# Patient Record
Sex: MALE | Race: BLACK OR AFRICAN AMERICAN | NOT HISPANIC OR LATINO | Employment: UNEMPLOYED | URBAN - METROPOLITAN AREA
[De-identification: names, ages, dates, MRNs, and addresses within clinical notes are randomized per-mention and may not be internally consistent; named-entity substitution may affect disease eponyms.]

---

## 2021-10-15 ENCOUNTER — APPOINTMENT (OUTPATIENT)
Dept: ULTRASOUND IMAGING | Facility: HOSPITAL | Age: 56
End: 2021-10-15

## 2021-10-15 ENCOUNTER — HOSPITAL ENCOUNTER (EMERGENCY)
Facility: HOSPITAL | Age: 56
Discharge: HOME | End: 2021-10-15
Attending: EMERGENCY MEDICINE | Admitting: EMERGENCY MEDICINE

## 2021-10-15 VITALS
RESPIRATION RATE: 14 BRPM | HEIGHT: 70 IN | OXYGEN SATURATION: 98 % | BODY MASS INDEX: 23.34 KG/M2 | WEIGHT: 163 LBS | HEART RATE: 69 BPM | SYSTOLIC BLOOD PRESSURE: 153 MMHG | TEMPERATURE: 97.8 F | DIASTOLIC BLOOD PRESSURE: 95 MMHG

## 2021-10-15 DIAGNOSIS — N43.3 HYDROCELE, RIGHT: Primary | ICD-10-CM

## 2021-10-15 DIAGNOSIS — N39.0 URINARY TRACT INFECTION WITHOUT HEMATURIA, SITE UNSPECIFIED: ICD-10-CM

## 2021-10-15 LAB
BACTERIA UR QL AUTO: ABNORMAL /HPF
BILIRUB UR QL STRIP: NEGATIVE
C TRACH RRNA CVX QL NAA+PROBE: NOT DETECTED
CLARITY UR: ABNORMAL
COLOR UR: YELLOW
GLUCOSE UR STRIP-MCNC: NEGATIVE MG/DL
HGB UR QL STRIP.AUTO: ABNORMAL
HYALINE CASTS UR QL AUTO: ABNORMAL /LPF
KETONES UR QL STRIP: NEGATIVE
LEUKOCYTE ESTERASE UR QL STRIP.AUTO: ABNORMAL
N GONORRHOEA RRNA SPEC QL NAA+PROBE: NOT DETECTED
NITRITE UR QL STRIP: POSITIVE
PH UR STRIP.AUTO: 5.5 [PH] (ref 5–8)
PROT UR QL STRIP: NEGATIVE
RBC # UR: ABNORMAL /HPF
REF LAB TEST METHOD: ABNORMAL
SP GR UR STRIP: 1.01 (ref 1–1.03)
SQUAMOUS #/AREA URNS HPF: ABNORMAL /HPF
UROBILINOGEN UR QL STRIP: ABNORMAL
WBC UR QL AUTO: ABNORMAL /HPF

## 2021-10-15 PROCEDURE — 93976 VASCULAR STUDY: CPT

## 2021-10-15 PROCEDURE — 87491 CHLMYD TRACH DNA AMP PROBE: CPT | Performed by: EMERGENCY MEDICINE

## 2021-10-15 PROCEDURE — 87086 URINE CULTURE/COLONY COUNT: CPT | Performed by: EMERGENCY MEDICINE

## 2021-10-15 PROCEDURE — 99283 EMERGENCY DEPT VISIT LOW MDM: CPT

## 2021-10-15 PROCEDURE — 87186 SC STD MICRODIL/AGAR DIL: CPT | Performed by: EMERGENCY MEDICINE

## 2021-10-15 PROCEDURE — 87077 CULTURE AEROBIC IDENTIFY: CPT | Performed by: EMERGENCY MEDICINE

## 2021-10-15 PROCEDURE — 76870 US EXAM SCROTUM: CPT

## 2021-10-15 PROCEDURE — 87591 N.GONORRHOEAE DNA AMP PROB: CPT | Performed by: EMERGENCY MEDICINE

## 2021-10-15 PROCEDURE — 81001 URINALYSIS AUTO W/SCOPE: CPT | Performed by: EMERGENCY MEDICINE

## 2021-10-15 RX ORDER — CEFDINIR 300 MG/1
300 CAPSULE ORAL 2 TIMES DAILY
Qty: 14 CAPSULE | Refills: 0 | Status: SHIPPED | OUTPATIENT
Start: 2021-10-15 | End: 2021-10-22

## 2021-10-15 NOTE — DISCHARGE INSTRUCTIONS
Follow-up with your primary doctor and urologist.  Return to the emergency room for any new or worsening symptoms or if you have any other questions or concerns.  Take antibiotic as prescribed.

## 2021-10-15 NOTE — ED PROVIDER NOTES
"Subjective   Chief complaint: Right testicle swelling    56-year-old male presents with right testicle swelling.  Patient states he has noticed this for about 2 or 3 days.  He reports minimal discomfort with this.  He has had no dysuria or hematuria.  He has had no urethral discharge.  He denies any fever.  He denies any injury to the area.  He went to an urgent care center today and they told him to come to the emergency room.  He states he feels like the swelling is getting slightly better at this point.      History provided by:  Patient      Review of Systems   Constitutional: Negative for fever.   HENT: Negative for congestion and sore throat.    Respiratory: Negative for cough and shortness of breath.    Cardiovascular: Negative for chest pain.   Gastrointestinal: Negative for abdominal pain.   Genitourinary: Positive for scrotal swelling. Negative for dysuria, penile discharge and testicular pain.   Musculoskeletal: Negative for back pain.   Neurological: Negative for headaches.       No past medical history on file.    No Known Allergies    No past surgical history on file.    No family history on file.    Social History     Socioeconomic History   • Marital status: Single       /95 (BP Location: Left arm, Patient Position: Sitting)   Pulse 69   Temp 97.8 °F (36.6 °C) (Temporal)   Resp 14   Ht 177.8 cm (70\")   Wt 73.9 kg (163 lb)   SpO2 98%   BMI 23.39 kg/m²       Objective   Physical Exam  Vitals and nursing note reviewed.   Constitutional:       Appearance: Normal appearance. He is well-developed.   HENT:      Head: Normocephalic and atraumatic.   Eyes:      Pupils: Pupils are equal, round, and reactive to light.   Cardiovascular:      Rate and Rhythm: Normal rate and regular rhythm.   Pulmonary:      Effort: Pulmonary effort is normal. No respiratory distress.   Abdominal:      General: Bowel sounds are normal.      Palpations: Abdomen is soft.      Tenderness: There is no abdominal " tenderness.   Genitourinary:     Comments: Mild diffuse right testicle swelling with minimal tenderness on exam.  There is no erythema or warmth.  Left testicle is normal.  No urethral discharge.  Skin:     General: Skin is warm and dry.   Neurological:      Mental Status: He is alert and oriented to person, place, and time.         Procedures           ED Course      Results for orders placed or performed during the hospital encounter of 10/15/21   Chlamydia trachomatis, Neisseria gonorrhoeae, PCR - Urine, Urine, Clean Catch    Specimen: Urine, Clean Catch   Result Value Ref Range    Chlamydia DNA by PCR Not Detected Not Detected, Invalid    Neisseria gonorrhoeae by PCR Not Detected Not Detected   Urinalysis With Culture If Indicated - Urine, Clean Catch    Specimen: Urine, Clean Catch   Result Value Ref Range    Color, UA Yellow Yellow, Straw    Appearance, UA Cloudy (A) Clear    pH, UA 5.5 5.0 - 8.0    Specific Gravity, UA 1.014 1.005 - 1.030    Glucose, UA Negative Negative    Ketones, UA Negative Negative    Bilirubin, UA Negative Negative    Blood, UA Small (1+) (A) Negative    Protein, UA Negative Negative    Leuk Esterase, UA Moderate (2+) (A) Negative    Nitrite, UA Positive (A) Negative    Urobilinogen, UA 0.2 E.U./dL 0.2 - 1.0 E.U./dL   Urinalysis, Microscopic Only - Urine, Clean Catch    Specimen: Urine, Clean Catch   Result Value Ref Range    RBC, UA 3-5 (A) None Seen /HPF    WBC, UA 31-50 (A) None Seen /HPF    Bacteria, UA 3+ (A) None Seen /HPF    Squamous Epithelial Cells, UA 0-2 None Seen, 0-2 /HPF    Hyaline Casts, UA 0-2 None Seen /LPF    Methodology Automated Microscopy      US Scrotum & Testicles    Result Date: 10/15/2021  1.Both testicles and epididymis appear within normal limits. 2.Moderate right and mild left hydroceles.  Electronically Signed By-Yoshi Samaniego MD On:10/15/2021 2:34 PM This report was finalized on [id number] by  Yoshi Samaniego MD.                                          MDM   Patient had the above evaluation.  Results were discussed with the patient.  Scrotal ultrasound was obtained and showed them both testicles and epididymis were normal.  He did have moderate right and mild left hydroceles.  Urinalysis shows evidence of urinary tract infection with moderate leukocyte esterase, positive nitrites, 3+ bacteria.  GC and Chlamydia are negative.  Patient states he just had a urinary tract infection a couple months ago.  He has seen urology in the past for voiding issues.  He will follow-up with urology again for his frequent UTIs.  He will be given a prescription for cefdinir.      Final diagnoses:   Hydrocele, right   Urinary tract infection without hematuria, site unspecified       ED Disposition  ED Disposition     ED Disposition Condition Comment    Discharge Stable           Spencer Randall MD  [address] [place] [address] [place] [address] [phone]    Call in 2 days           Medication List      New Prescriptions    cefdinir 300 MG capsule  Commonly known as: OMNICEF  Take 1 capsule by mouth 2 (Two) Times a Day for 7 days.           Where to Get Your Medications      These medications were sent to [id number] - [place], [place] - [address] [facility] AT [facility] [address] [address] [address] - [phone]  - [phone]   [address] [place] [address]    Phone: [phone]   · cefdinir 300 MG capsule          Blayne Miguel MD  10/15/21 8149

## 2021-10-17 LAB — BACTERIA SPEC AEROBE CULT: ABNORMAL

## 2021-10-17 NOTE — PHARMACY RECOMMENDATION
Patient urine culture resulted with E. coli. Susceptible to Cephalosporins (3rd gen). Patient was given Rx for cefdinir. Therapy is appropriate coverage. No further follow-up required..    Microbiology Results (last 10 days)     Procedure  Component  Value  -  Date/Time  Chlamydia trachomatis, Neisseria gonorrhoeae, PCR - Urine, Urine, Clean Catch [[id number]]  (Normal)  Collected: 10/15/21 1412  Lab Status: Final result  Specimen: Urine, Clean Catch  Updated: 10/15/21 1548  Chlamydia DNA by PCR  Not Detected  Neisseria gonorrhoeae by PCR  Not Detected  Urine Culture - Urine, Urine, Clean Catch [[id number]]  (Abnormal)  (Susceptibility)  Collected: 10/15/21 1412  Lab Status: Final result  Specimen: Urine, Clean Catch  Updated: 10/17/21 1101  Urine Culture  >100,000 CFU/mL Escherichia coli  Susceptibility   Escherichia coli  ARCHANA  Ampicillin  Resistant  Ampicillin + Sulbactam  Resistant  Cefazolin  Susceptible  Cefepime  Susceptible  Ceftazidime  Susceptible  Ceftriaxone  Susceptible  Gentamicin  Susceptible  Levofloxacin  Susceptible  Nitrofurantoin  Susceptible  Piperacillin + Tazobactam  Susceptible  Tetracycline  Susceptible  Trimethoprim + Sulfamethoxazole  Susceptible      Linear View        Kirstie Wan, Cheryl  10/17/2021 11:35 EDT

## 2023-10-05 ENCOUNTER — HOSPITAL ENCOUNTER (INPATIENT)
Facility: HOSPITAL | Age: 58
LOS: 1 days | Discharge: HOME | DRG: 322 | End: 2023-10-07
Attending: EMERGENCY MEDICINE | Admitting: STUDENT IN AN ORGANIZED HEALTH CARE EDUCATION/TRAINING PROGRAM
Payer: MEDICARE

## 2023-10-05 ENCOUNTER — APPOINTMENT (OUTPATIENT)
Dept: GENERAL RADIOLOGY | Facility: HOSPITAL | Age: 58
DRG: 322 | End: 2023-10-05
Payer: MEDICARE

## 2023-10-05 DIAGNOSIS — I21.4 NSTEMI (NON-ST ELEVATED MYOCARDIAL INFARCTION): Primary | ICD-10-CM

## 2023-10-05 DIAGNOSIS — R07.9 CHEST PAIN, UNSPECIFIED TYPE: ICD-10-CM

## 2023-10-05 DIAGNOSIS — R94.31 ABNORMAL EKG: ICD-10-CM

## 2023-10-05 LAB
ALBUMIN SERPL-MCNC: 4 G/DL (ref 3.5–5.2)
ALBUMIN/GLOB SERPL: 1.3 G/DL
ALP SERPL-CCNC: 56 U/L (ref 39–117)
ALT SERPL W P-5'-P-CCNC: 26 U/L (ref 1–41)
AMPHET+METHAMPHET UR QL: NEGATIVE
ANION GAP SERPL CALCULATED.3IONS-SCNC: 10 MMOL/L (ref 5–15)
AST SERPL-CCNC: 43 U/L (ref 1–40)
BARBITURATES UR QL SCN: NEGATIVE
BASOPHILS # BLD AUTO: 0 10*3/MM3 (ref 0–0.2)
BASOPHILS NFR BLD AUTO: 0.7 % (ref 0–1.5)
BENZODIAZ UR QL SCN: NEGATIVE
BILIRUB SERPL-MCNC: 0.4 MG/DL (ref 0–1.2)
BUN SERPL-MCNC: 12 MG/DL (ref 6–20)
BUN/CREAT SERPL: 12.9 (ref 7–25)
CALCIUM SPEC-SCNC: 9.1 MG/DL (ref 8.6–10.5)
CANNABINOIDS SERPL QL: POSITIVE
CHLORIDE SERPL-SCNC: 103 MMOL/L (ref 98–107)
CO2 SERPL-SCNC: 24 MMOL/L (ref 22–29)
COCAINE UR QL: NEGATIVE
CREAT SERPL-MCNC: 0.93 MG/DL (ref 0.76–1.27)
D DIMER PPP FEU-MCNC: 0.29 MG/L (FEU) (ref 0–0.57)
DEPRECATED RDW RBC AUTO: 52.9 FL (ref 37–54)
EGFRCR SERPLBLD CKD-EPI 2021: 95.8 ML/MIN/1.73
EOSINOPHIL # BLD AUTO: 0.2 10*3/MM3 (ref 0–0.4)
EOSINOPHIL NFR BLD AUTO: 3.5 % (ref 0.3–6.2)
ERYTHROCYTE [DISTWIDTH] IN BLOOD BY AUTOMATED COUNT: 14.9 % (ref 12.3–15.4)
GEN 5 2HR TROPONIN T REFLEX: 122 NG/L
GLOBULIN UR ELPH-MCNC: 3.1 GM/DL
GLUCOSE SERPL-MCNC: 119 MG/DL (ref 65–99)
HCT VFR BLD AUTO: 42.4 % (ref 37.5–51)
HGB BLD-MCNC: 14 G/DL (ref 13–17.7)
HOLD SPECIMEN: NORMAL
HOLD SPECIMEN: NORMAL
LYMPHOCYTES # BLD AUTO: 1.3 10*3/MM3 (ref 0.7–3.1)
LYMPHOCYTES NFR BLD AUTO: 25.3 % (ref 19.6–45.3)
MCH RBC QN AUTO: 31.5 PG (ref 26.6–33)
MCHC RBC AUTO-ENTMCNC: 33 G/DL (ref 31.5–35.7)
MCV RBC AUTO: 95.2 FL (ref 79–97)
METHADONE UR QL SCN: NEGATIVE
MONOCYTES # BLD AUTO: 0.5 10*3/MM3 (ref 0.1–0.9)
MONOCYTES NFR BLD AUTO: 10 % (ref 5–12)
NEUTROPHILS NFR BLD AUTO: 3 10*3/MM3 (ref 1.7–7)
NEUTROPHILS NFR BLD AUTO: 60.5 % (ref 42.7–76)
NRBC BLD AUTO-RTO: 0 /100 WBC (ref 0–0.2)
OPIATES UR QL: NEGATIVE
OXYCODONE UR QL SCN: NEGATIVE
PLATELET # BLD AUTO: 196 10*3/MM3 (ref 140–450)
PMV BLD AUTO: 7.6 FL (ref 6–12)
POTASSIUM SERPL-SCNC: 3.9 MMOL/L (ref 3.5–5.2)
PROT SERPL-MCNC: 7.1 G/DL (ref 6–8.5)
RBC # BLD AUTO: 4.45 10*6/MM3 (ref 4.14–5.8)
SODIUM SERPL-SCNC: 137 MMOL/L (ref 136–145)
TROPONIN T DELTA: 28 NG/L
TROPONIN T SERPL HS-MCNC: 94 NG/L
WBC NRBC COR # BLD: 5 10*3/MM3 (ref 3.4–10.8)
WHOLE BLOOD HOLD COAG: NORMAL

## 2023-10-05 PROCEDURE — 84484 ASSAY OF TROPONIN QUANT: CPT | Performed by: PHYSICIAN ASSISTANT

## 2023-10-05 PROCEDURE — 71045 X-RAY EXAM CHEST 1 VIEW: CPT

## 2023-10-05 PROCEDURE — 99285 EMERGENCY DEPT VISIT HI MDM: CPT

## 2023-10-05 PROCEDURE — 80307 DRUG TEST PRSMV CHEM ANLYZR: CPT | Performed by: PHYSICIAN ASSISTANT

## 2023-10-05 PROCEDURE — 36415 COLL VENOUS BLD VENIPUNCTURE: CPT

## 2023-10-05 PROCEDURE — 93005 ELECTROCARDIOGRAM TRACING: CPT | Performed by: EMERGENCY MEDICINE

## 2023-10-05 PROCEDURE — 85379 FIBRIN DEGRADATION QUANT: CPT | Performed by: PHYSICIAN ASSISTANT

## 2023-10-05 PROCEDURE — 85610 PROTHROMBIN TIME: CPT | Performed by: PHYSICIAN ASSISTANT

## 2023-10-05 PROCEDURE — 85025 COMPLETE CBC W/AUTO DIFF WBC: CPT | Performed by: PHYSICIAN ASSISTANT

## 2023-10-05 PROCEDURE — 80053 COMPREHEN METABOLIC PANEL: CPT | Performed by: PHYSICIAN ASSISTANT

## 2023-10-05 PROCEDURE — 85730 THROMBOPLASTIN TIME PARTIAL: CPT | Performed by: PHYSICIAN ASSISTANT

## 2023-10-05 PROCEDURE — 93005 ELECTROCARDIOGRAM TRACING: CPT

## 2023-10-05 RX ORDER — ASPIRIN 81 MG/1
324 TABLET, CHEWABLE ORAL ONCE
Status: COMPLETED | OUTPATIENT
Start: 2023-10-05 | End: 2023-10-05

## 2023-10-05 RX ORDER — NITROGLYCERIN 20 MG/100ML
5-200 INJECTION INTRAVENOUS
Status: DISCONTINUED | OUTPATIENT
Start: 2023-10-06 | End: 2023-10-07 | Stop reason: HOSPADM

## 2023-10-05 RX ORDER — HEPARIN SODIUM 10000 [USP'U]/100ML
12 INJECTION, SOLUTION INTRAVENOUS
Status: DISCONTINUED | OUTPATIENT
Start: 2023-10-06 | End: 2023-10-07 | Stop reason: HOSPADM

## 2023-10-05 RX ORDER — FAMOTIDINE 10 MG/ML
20 INJECTION, SOLUTION INTRAVENOUS ONCE
Status: COMPLETED | OUTPATIENT
Start: 2023-10-05 | End: 2023-10-05

## 2023-10-05 RX ORDER — ASPIRIN 81 MG/1
324 TABLET, CHEWABLE ORAL ONCE
Status: DISCONTINUED | OUTPATIENT
Start: 2023-10-05 | End: 2023-10-05

## 2023-10-05 RX ORDER — SODIUM CHLORIDE 0.9 % (FLUSH) 0.9 %
10 SYRINGE (ML) INJECTION AS NEEDED
Status: DISCONTINUED | OUTPATIENT
Start: 2023-10-05 | End: 2023-10-07 | Stop reason: HOSPADM

## 2023-10-05 RX ADMIN — FAMOTIDINE 20 MG: 10 INJECTION INTRAVENOUS at 22:04

## 2023-10-05 RX ADMIN — ASPIRIN 81 MG CHEWABLE TABLET 324 MG: 81 TABLET CHEWABLE at 22:32

## 2023-10-05 RX ADMIN — NITROGLYCERIN 1 INCH: 20 OINTMENT TOPICAL at 22:32

## 2023-10-05 NOTE — Clinical Note
Second inflation of balloon - Max pressure = 14 nile. 2nd Inflation of balloon - Duration = 28 seconds.

## 2023-10-05 NOTE — Clinical Note
Third inflation of balloon - Max pressure = 18 nile. 3rd Inflation of balloon - Duration = 14 seconds.

## 2023-10-05 NOTE — Clinical Note
Fourth inflation of balloon - Max pressure = 6 nile. 4th Inflation of balloon - Duration = 12 seconds.

## 2023-10-05 NOTE — Clinical Note
Second inflation of balloon - Max pressure = 18 nile. 2nd Inflation of balloon - Duration = 18 seconds.

## 2023-10-05 NOTE — Clinical Note
Second inflation of balloon - Max pressure = 6 nile. 2nd Inflation of balloon - Duration = 10 seconds.

## 2023-10-05 NOTE — Clinical Note
Third inflation of balloon - Max pressure = 6 nile. 3rd Inflation of balloon - Duration = 17 seconds.

## 2023-10-06 ENCOUNTER — APPOINTMENT (OUTPATIENT)
Dept: CARDIOLOGY | Facility: HOSPITAL | Age: 58
DRG: 322 | End: 2023-10-06
Payer: MEDICARE

## 2023-10-06 PROBLEM — R07.9 CHEST PAIN: Status: ACTIVE | Noted: 2023-10-05

## 2023-10-06 PROBLEM — I21.4 NSTEMI (NON-ST ELEVATED MYOCARDIAL INFARCTION): Status: ACTIVE | Noted: 2023-10-06

## 2023-10-06 PROBLEM — R94.31 ABNORMAL EKG: Status: ACTIVE | Noted: 2023-10-05

## 2023-10-06 LAB
ACT BLD: 149 SECONDS (ref 89–137)
ACT BLD: 161 SECONDS (ref 89–137)
ACT BLD: 245 SECONDS (ref 89–137)
ACT BLD: 281 SECONDS (ref 89–137)
ANION GAP SERPL CALCULATED.3IONS-SCNC: 10 MMOL/L (ref 5–15)
APTT PPP: 25.3 SECONDS (ref 61–76.5)
APTT PPP: 25.4 SECONDS (ref 61–76.5)
APTT PPP: 48.5 SECONDS (ref 61–76.5)
BASOPHILS # BLD AUTO: 0 10*3/MM3 (ref 0–0.2)
BASOPHILS NFR BLD AUTO: 0.6 % (ref 0–1.5)
BH CV ECHO MEAS - ACS: 1.79 CM
BH CV ECHO MEAS - AO MAX PG: 9.1 MMHG
BH CV ECHO MEAS - AO MEAN PG: 5.5 MMHG
BH CV ECHO MEAS - AO ROOT DIAM: 3.3 CM
BH CV ECHO MEAS - AO V2 MAX: 150.5 CM/SEC
BH CV ECHO MEAS - AO V2 VTI: 31.1 CM
BH CV ECHO MEAS - AVA(I,D): 2.1 CM2
BH CV ECHO MEAS - EDV(CUBED): 88.7 ML
BH CV ECHO MEAS - EDV(MOD-SP4): 73.4 ML
BH CV ECHO MEAS - EF(MOD-BP): 51 %
BH CV ECHO MEAS - EF(MOD-SP4): 51.4 %
BH CV ECHO MEAS - ESV(CUBED): 36.6 ML
BH CV ECHO MEAS - ESV(MOD-SP4): 35.6 ML
BH CV ECHO MEAS - FS: 25.6 %
BH CV ECHO MEAS - IVS/LVPW: 0.98 CM
BH CV ECHO MEAS - IVSD: 1.16 CM
BH CV ECHO MEAS - LA DIMENSION: 3 CM
BH CV ECHO MEAS - LV DIASTOLIC VOL/BSA (35-75): 38.3 CM2
BH CV ECHO MEAS - LV MASS(C)D: 188 GRAMS
BH CV ECHO MEAS - LV MAX PG: 4.6 MMHG
BH CV ECHO MEAS - LV MEAN PG: 2.9 MMHG
BH CV ECHO MEAS - LV SYSTOLIC VOL/BSA (12-30): 18.6 CM2
BH CV ECHO MEAS - LV V1 MAX: 107.1 CM/SEC
BH CV ECHO MEAS - LV V1 VTI: 20.2 CM
BH CV ECHO MEAS - LVIDD: 4.5 CM
BH CV ECHO MEAS - LVIDS: 3.3 CM
BH CV ECHO MEAS - LVOT AREA: 3.2 CM2
BH CV ECHO MEAS - LVOT DIAM: 2.03 CM
BH CV ECHO MEAS - LVPWD: 1.18 CM
BH CV ECHO MEAS - MR MAX PG: 24.9 MMHG
BH CV ECHO MEAS - MR MAX VEL: 249.6 CM/SEC
BH CV ECHO MEAS - MV A MAX VEL: 82.5 CM/SEC
BH CV ECHO MEAS - MV DEC SLOPE: 388.5 CM/SEC2
BH CV ECHO MEAS - MV DEC TIME: 0.18 SEC
BH CV ECHO MEAS - MV E MAX VEL: 69.9 CM/SEC
BH CV ECHO MEAS - MV E/A: 0.85
BH CV ECHO MEAS - MV MAX PG: 2.7 MMHG
BH CV ECHO MEAS - MV MEAN PG: 1.51 MMHG
BH CV ECHO MEAS - MV V2 VTI: 23 CM
BH CV ECHO MEAS - MVA(VTI): 2.8 CM2
BH CV ECHO MEAS - PA ACC TIME: 0.06 SEC
BH CV ECHO MEAS - PA V2 MAX: 91.2 CM/SEC
BH CV ECHO MEAS - PI END-D VEL: 105.7 CM/SEC
BH CV ECHO MEAS - PULM A REVS DUR: 0.1 SEC
BH CV ECHO MEAS - PULM A REVS VEL: 24.7 CM/SEC
BH CV ECHO MEAS - PULM DIAS VEL: 34.4 CM/SEC
BH CV ECHO MEAS - PULM S/D: 1.38
BH CV ECHO MEAS - PULM SYS VEL: 47.5 CM/SEC
BH CV ECHO MEAS - RAP SYSTOLE: 3 MMHG
BH CV ECHO MEAS - RV MAX PG: 1.16 MMHG
BH CV ECHO MEAS - RV V1 MAX: 53.8 CM/SEC
BH CV ECHO MEAS - RV V1 VTI: 10.8 CM
BH CV ECHO MEAS - RVDD: 2.9 CM
BH CV ECHO MEAS - RVSP: 23 MMHG
BH CV ECHO MEAS - SI(MOD-SP4): 19.7 ML/M2
BH CV ECHO MEAS - SV(LVOT): 65.2 ML
BH CV ECHO MEAS - SV(MOD-SP4): 37.7 ML
BH CV ECHO MEAS - TR MAX PG: 20 MMHG
BH CV ECHO MEAS - TR MAX VEL: 221.7 CM/SEC
BUN SERPL-MCNC: 9 MG/DL (ref 6–20)
BUN/CREAT SERPL: 10.3 (ref 7–25)
CALCIUM SPEC-SCNC: 8.7 MG/DL (ref 8.6–10.5)
CHLORIDE SERPL-SCNC: 103 MMOL/L (ref 98–107)
CO2 SERPL-SCNC: 25 MMOL/L (ref 22–29)
CREAT SERPL-MCNC: 0.87 MG/DL (ref 0.76–1.27)
DEPRECATED RDW RBC AUTO: 49 FL (ref 37–54)
EGFRCR SERPLBLD CKD-EPI 2021: 100.6 ML/MIN/1.73
EOSINOPHIL # BLD AUTO: 0.3 10*3/MM3 (ref 0–0.4)
EOSINOPHIL NFR BLD AUTO: 7.1 % (ref 0.3–6.2)
ERYTHROCYTE [DISTWIDTH] IN BLOOD BY AUTOMATED COUNT: 14.7 % (ref 12.3–15.4)
GLUCOSE SERPL-MCNC: 96 MG/DL (ref 65–99)
HCT VFR BLD AUTO: 41.7 % (ref 37.5–51)
HGB BLD-MCNC: 13.6 G/DL (ref 13–17.7)
INR PPP: 0.98 (ref 0.93–1.1)
LYMPHOCYTES # BLD AUTO: 1.4 10*3/MM3 (ref 0.7–3.1)
LYMPHOCYTES NFR BLD AUTO: 34.4 % (ref 19.6–45.3)
MCH RBC QN AUTO: 31.7 PG (ref 26.6–33)
MCHC RBC AUTO-ENTMCNC: 32.7 G/DL (ref 31.5–35.7)
MCV RBC AUTO: 96.8 FL (ref 79–97)
MONOCYTES # BLD AUTO: 0.4 10*3/MM3 (ref 0.1–0.9)
MONOCYTES NFR BLD AUTO: 10.3 % (ref 5–12)
NEUTROPHILS NFR BLD AUTO: 1.9 10*3/MM3 (ref 1.7–7)
NEUTROPHILS NFR BLD AUTO: 47.6 % (ref 42.7–76)
NRBC BLD AUTO-RTO: 0.1 /100 WBC (ref 0–0.2)
PLATELET # BLD AUTO: 203 10*3/MM3 (ref 140–450)
PMV BLD AUTO: 7.9 FL (ref 6–12)
POTASSIUM SERPL-SCNC: 3.7 MMOL/L (ref 3.5–5.2)
PROTHROMBIN TIME: 10.7 SECONDS (ref 9.6–11.7)
QT INTERVAL: 426 MS
QT INTERVAL: 431 MS
QT INTERVAL: 435 MS
QTC INTERVAL: 442 MS
QTC INTERVAL: 446 MS
QTC INTERVAL: 448 MS
RBC # BLD AUTO: 4.3 10*6/MM3 (ref 4.14–5.8)
SODIUM SERPL-SCNC: 138 MMOL/L (ref 136–145)
TROPONIN T SERPL HS-MCNC: 322 NG/L
WBC NRBC COR # BLD: 4 10*3/MM3 (ref 3.4–10.8)

## 2023-10-06 PROCEDURE — 25010000002 HYDRALAZINE PER 20 MG: Performed by: NURSE PRACTITIONER

## 2023-10-06 PROCEDURE — 25510000001 IOPAMIDOL PER 1 ML: Performed by: INTERNAL MEDICINE

## 2023-10-06 PROCEDURE — 25010000002 HEPARIN (PORCINE) PER 1000 UNITS: Performed by: INTERNAL MEDICINE

## 2023-10-06 PROCEDURE — 25010000002 NITROGLYCERIN 5 MG/ML SOLUTION: Performed by: INTERNAL MEDICINE

## 2023-10-06 PROCEDURE — 85730 THROMBOPLASTIN TIME PARTIAL: CPT | Performed by: STUDENT IN AN ORGANIZED HEALTH CARE EDUCATION/TRAINING PROGRAM

## 2023-10-06 PROCEDURE — 4A023N7 MEASUREMENT OF CARDIAC SAMPLING AND PRESSURE, LEFT HEART, PERCUTANEOUS APPROACH: ICD-10-PCS | Performed by: INTERNAL MEDICINE

## 2023-10-06 PROCEDURE — 93306 TTE W/DOPPLER COMPLETE: CPT

## 2023-10-06 PROCEDURE — 99222 1ST HOSP IP/OBS MODERATE 55: CPT | Performed by: STUDENT IN AN ORGANIZED HEALTH CARE EDUCATION/TRAINING PROGRAM

## 2023-10-06 PROCEDURE — 25010000002 NITROGLYCERIN 200 MCG/ML SOLUTION: Performed by: PHYSICIAN ASSISTANT

## 2023-10-06 PROCEDURE — 93458 L HRT ARTERY/VENTRICLE ANGIO: CPT | Performed by: INTERNAL MEDICINE

## 2023-10-06 PROCEDURE — C1874 STENT, COATED/COV W/DEL SYS: HCPCS | Performed by: INTERNAL MEDICINE

## 2023-10-06 PROCEDURE — C1894 INTRO/SHEATH, NON-LASER: HCPCS | Performed by: INTERNAL MEDICINE

## 2023-10-06 PROCEDURE — C9600 PERC DRUG-EL COR STENT SING: HCPCS | Performed by: INTERNAL MEDICINE

## 2023-10-06 PROCEDURE — C1887 CATHETER, GUIDING: HCPCS | Performed by: INTERNAL MEDICINE

## 2023-10-06 PROCEDURE — 85347 COAGULATION TIME ACTIVATED: CPT

## 2023-10-06 PROCEDURE — 99153 MOD SED SAME PHYS/QHP EA: CPT | Performed by: INTERNAL MEDICINE

## 2023-10-06 PROCEDURE — 25010000002 FENTANYL CITRATE (PF) 100 MCG/2ML SOLUTION: Performed by: INTERNAL MEDICINE

## 2023-10-06 PROCEDURE — 25010000002 HEPARIN (PORCINE) 25000-0.45 UT/250ML-% SOLUTION: Performed by: PHYSICIAN ASSISTANT

## 2023-10-06 PROCEDURE — C1769 GUIDE WIRE: HCPCS | Performed by: INTERNAL MEDICINE

## 2023-10-06 PROCEDURE — 84484 ASSAY OF TROPONIN QUANT: CPT | Performed by: NURSE PRACTITIONER

## 2023-10-06 PROCEDURE — C1725 CATH, TRANSLUMIN NON-LASER: HCPCS | Performed by: INTERNAL MEDICINE

## 2023-10-06 PROCEDURE — 93306 TTE W/DOPPLER COMPLETE: CPT | Performed by: INTERNAL MEDICINE

## 2023-10-06 PROCEDURE — 93005 ELECTROCARDIOGRAM TRACING: CPT | Performed by: STUDENT IN AN ORGANIZED HEALTH CARE EDUCATION/TRAINING PROGRAM

## 2023-10-06 PROCEDURE — B2111ZZ FLUOROSCOPY OF MULTIPLE CORONARY ARTERIES USING LOW OSMOLAR CONTRAST: ICD-10-PCS | Performed by: INTERNAL MEDICINE

## 2023-10-06 PROCEDURE — 25010000002 MIDAZOLAM PER 1 MG: Performed by: INTERNAL MEDICINE

## 2023-10-06 PROCEDURE — 92928 PRQ TCAT PLMT NTRAC ST 1 LES: CPT | Performed by: INTERNAL MEDICINE

## 2023-10-06 PROCEDURE — 99152 MOD SED SAME PHYS/QHP 5/>YRS: CPT | Performed by: INTERNAL MEDICINE

## 2023-10-06 PROCEDURE — 027034Z DILATION OF CORONARY ARTERY, ONE ARTERY WITH DRUG-ELUTING INTRALUMINAL DEVICE, PERCUTANEOUS APPROACH: ICD-10-PCS | Performed by: INTERNAL MEDICINE

## 2023-10-06 PROCEDURE — 80048 BASIC METABOLIC PNL TOTAL CA: CPT | Performed by: NURSE PRACTITIONER

## 2023-10-06 PROCEDURE — 85025 COMPLETE CBC W/AUTO DIFF WBC: CPT | Performed by: NURSE PRACTITIONER

## 2023-10-06 PROCEDURE — 93005 ELECTROCARDIOGRAM TRACING: CPT | Performed by: INTERNAL MEDICINE

## 2023-10-06 PROCEDURE — 93010 ELECTROCARDIOGRAM REPORT: CPT | Performed by: INTERNAL MEDICINE

## 2023-10-06 DEVICE — XIENCE SKYPOINT™ EVEROLIMUS ELUTING CORONARY STENT SYSTEM 2.50 MM X 33 MM / RAPID-EXCHANGE
Type: IMPLANTABLE DEVICE | Site: CORONARY | Status: FUNCTIONAL
Brand: XIENCE SKYPOINT™

## 2023-10-06 RX ORDER — BISACODYL 10 MG
10 SUPPOSITORY, RECTAL RECTAL DAILY PRN
Status: DISCONTINUED | OUTPATIENT
Start: 2023-10-06 | End: 2023-10-07 | Stop reason: HOSPADM

## 2023-10-06 RX ORDER — CHOLECALCIFEROL (VITAMIN D3) 125 MCG
5 CAPSULE ORAL NIGHTLY PRN
Status: DISCONTINUED | OUTPATIENT
Start: 2023-10-06 | End: 2023-10-07 | Stop reason: HOSPADM

## 2023-10-06 RX ORDER — BUPROPION HYDROCHLORIDE 300 MG/1
300 TABLET ORAL DAILY
COMMUNITY

## 2023-10-06 RX ORDER — NITROGLYCERIN 5 MG/ML
INJECTION, SOLUTION INTRAVENOUS
Status: DISCONTINUED | OUTPATIENT
Start: 2023-10-06 | End: 2023-10-06 | Stop reason: HOSPADM

## 2023-10-06 RX ORDER — AMLODIPINE AND VALSARTAN 5; 160 MG/1; MG/1
1 TABLET ORAL
COMMUNITY

## 2023-10-06 RX ORDER — ONDANSETRON 4 MG/1
4 TABLET, FILM COATED ORAL EVERY 6 HOURS PRN
Status: DISCONTINUED | OUTPATIENT
Start: 2023-10-06 | End: 2023-10-07 | Stop reason: HOSPADM

## 2023-10-06 RX ORDER — METRONIDAZOLE 7.5 MG/G
GEL TOPICAL 2 TIMES DAILY
Status: DISCONTINUED | OUTPATIENT
Start: 2023-10-06 | End: 2023-10-07 | Stop reason: HOSPADM

## 2023-10-06 RX ORDER — ONDANSETRON 2 MG/ML
4 INJECTION INTRAMUSCULAR; INTRAVENOUS EVERY 6 HOURS PRN
Status: DISCONTINUED | OUTPATIENT
Start: 2023-10-06 | End: 2023-10-07 | Stop reason: HOSPADM

## 2023-10-06 RX ORDER — BUPROPION HYDROCHLORIDE 150 MG/1
300 TABLET ORAL DAILY
Status: DISCONTINUED | OUTPATIENT
Start: 2023-10-06 | End: 2023-10-07 | Stop reason: HOSPADM

## 2023-10-06 RX ORDER — ALUMINA, MAGNESIA, AND SIMETHICONE 2400; 2400; 240 MG/30ML; MG/30ML; MG/30ML
15 SUSPENSION ORAL EVERY 6 HOURS PRN
Status: DISCONTINUED | OUTPATIENT
Start: 2023-10-06 | End: 2023-10-07 | Stop reason: HOSPADM

## 2023-10-06 RX ORDER — ABACAVIR SULFATE, DOLUTEGRAVIR SODIUM, LAMIVUDINE 600; 50; 300 MG/1; MG/1; MG/1
1 TABLET, FILM COATED ORAL DAILY
COMMUNITY

## 2023-10-06 RX ORDER — METRONIDAZOLE 10 MG/G
1 GEL TOPICAL DAILY
COMMUNITY

## 2023-10-06 RX ORDER — ASPIRIN 81 MG/1
81 TABLET ORAL DAILY
Status: DISCONTINUED | OUTPATIENT
Start: 2023-10-07 | End: 2023-10-07 | Stop reason: HOSPADM

## 2023-10-06 RX ORDER — VALSARTAN 80 MG/1
160 TABLET ORAL
Status: DISCONTINUED | OUTPATIENT
Start: 2023-10-06 | End: 2023-10-07 | Stop reason: HOSPADM

## 2023-10-06 RX ORDER — NITROGLYCERIN 0.4 MG/1
0.4 TABLET SUBLINGUAL
Status: DISCONTINUED | OUTPATIENT
Start: 2023-10-06 | End: 2023-10-07 | Stop reason: HOSPADM

## 2023-10-06 RX ORDER — POLYETHYLENE GLYCOL 3350 17 G/17G
17 POWDER, FOR SOLUTION ORAL DAILY PRN
Status: DISCONTINUED | OUTPATIENT
Start: 2023-10-06 | End: 2023-10-07 | Stop reason: HOSPADM

## 2023-10-06 RX ORDER — ATORVASTATIN CALCIUM 40 MG/1
80 TABLET, FILM COATED ORAL NIGHTLY
Status: DISCONTINUED | OUTPATIENT
Start: 2023-10-06 | End: 2023-10-07 | Stop reason: HOSPADM

## 2023-10-06 RX ORDER — SODIUM CHLORIDE 9 MG/ML
30 INJECTION, SOLUTION INTRAVENOUS CONTINUOUS
Status: DISPENSED | OUTPATIENT
Start: 2023-10-06 | End: 2023-10-06

## 2023-10-06 RX ORDER — MIDAZOLAM HYDROCHLORIDE 1 MG/ML
INJECTION INTRAMUSCULAR; INTRAVENOUS
Status: DISCONTINUED | OUTPATIENT
Start: 2023-10-06 | End: 2023-10-06 | Stop reason: HOSPADM

## 2023-10-06 RX ORDER — SODIUM CHLORIDE 9 MG/ML
250 INJECTION, SOLUTION INTRAVENOUS ONCE AS NEEDED
Status: DISCONTINUED | OUTPATIENT
Start: 2023-10-06 | End: 2023-10-07 | Stop reason: HOSPADM

## 2023-10-06 RX ORDER — ACETAMINOPHEN 160 MG/5ML
650 SOLUTION ORAL EVERY 4 HOURS PRN
Status: DISCONTINUED | OUTPATIENT
Start: 2023-10-06 | End: 2023-10-07 | Stop reason: HOSPADM

## 2023-10-06 RX ORDER — ATORVASTATIN CALCIUM 40 MG/1
40 TABLET, FILM COATED ORAL NIGHTLY
Status: DISCONTINUED | OUTPATIENT
Start: 2023-10-06 | End: 2023-10-06 | Stop reason: SDUPTHER

## 2023-10-06 RX ORDER — HEPARIN SODIUM 1000 [USP'U]/ML
INJECTION, SOLUTION INTRAVENOUS; SUBCUTANEOUS
Status: DISCONTINUED | OUTPATIENT
Start: 2023-10-06 | End: 2023-10-06 | Stop reason: HOSPADM

## 2023-10-06 RX ORDER — FENTANYL CITRATE 50 UG/ML
INJECTION, SOLUTION INTRAMUSCULAR; INTRAVENOUS
Status: DISCONTINUED | OUTPATIENT
Start: 2023-10-06 | End: 2023-10-06 | Stop reason: HOSPADM

## 2023-10-06 RX ORDER — SODIUM CHLORIDE 0.9 % (FLUSH) 0.9 %
10 SYRINGE (ML) INJECTION EVERY 12 HOURS SCHEDULED
Status: DISCONTINUED | OUTPATIENT
Start: 2023-10-06 | End: 2023-10-07 | Stop reason: HOSPADM

## 2023-10-06 RX ORDER — AMLODIPINE BESYLATE 5 MG/1
5 TABLET ORAL
Status: DISCONTINUED | OUTPATIENT
Start: 2023-10-06 | End: 2023-10-07 | Stop reason: HOSPADM

## 2023-10-06 RX ORDER — AMOXICILLIN 250 MG
2 CAPSULE ORAL 2 TIMES DAILY
Status: DISCONTINUED | OUTPATIENT
Start: 2023-10-06 | End: 2023-10-07 | Stop reason: HOSPADM

## 2023-10-06 RX ORDER — LIDOCAINE HYDROCHLORIDE 20 MG/ML
INJECTION, SOLUTION INFILTRATION; PERINEURAL
Status: DISCONTINUED | OUTPATIENT
Start: 2023-10-06 | End: 2023-10-06 | Stop reason: HOSPADM

## 2023-10-06 RX ORDER — NICOTINE 21 MG/24HR
1 PATCH, TRANSDERMAL 24 HOURS TRANSDERMAL DAILY PRN
Status: DISCONTINUED | OUTPATIENT
Start: 2023-10-06 | End: 2023-10-07 | Stop reason: HOSPADM

## 2023-10-06 RX ORDER — MINOCYCLINE HYDROCHLORIDE 100 MG/1
100 CAPSULE ORAL EVERY 12 HOURS SCHEDULED
Status: DISCONTINUED | OUTPATIENT
Start: 2023-10-06 | End: 2023-10-07 | Stop reason: HOSPADM

## 2023-10-06 RX ORDER — SODIUM CHLORIDE 9 MG/ML
40 INJECTION, SOLUTION INTRAVENOUS AS NEEDED
Status: DISCONTINUED | OUTPATIENT
Start: 2023-10-06 | End: 2023-10-07 | Stop reason: HOSPADM

## 2023-10-06 RX ORDER — MINOCYCLINE HYDROCHLORIDE 100 MG/1
100 CAPSULE ORAL 2 TIMES DAILY
COMMUNITY

## 2023-10-06 RX ORDER — BISACODYL 5 MG/1
5 TABLET, DELAYED RELEASE ORAL DAILY PRN
Status: DISCONTINUED | OUTPATIENT
Start: 2023-10-06 | End: 2023-10-07 | Stop reason: HOSPADM

## 2023-10-06 RX ORDER — HYDRALAZINE HYDROCHLORIDE 20 MG/ML
10 INJECTION INTRAMUSCULAR; INTRAVENOUS EVERY 4 HOURS PRN
Status: DISCONTINUED | OUTPATIENT
Start: 2023-10-06 | End: 2023-10-07 | Stop reason: HOSPADM

## 2023-10-06 RX ORDER — ASPIRIN 81 MG/1
81 TABLET ORAL DAILY
Status: DISCONTINUED | OUTPATIENT
Start: 2023-10-06 | End: 2023-10-07 | Stop reason: HOSPADM

## 2023-10-06 RX ORDER — ACETAMINOPHEN 325 MG/1
650 TABLET ORAL EVERY 4 HOURS PRN
Status: DISCONTINUED | OUTPATIENT
Start: 2023-10-06 | End: 2023-10-07 | Stop reason: HOSPADM

## 2023-10-06 RX ORDER — ACETAMINOPHEN 650 MG/1
650 SUPPOSITORY RECTAL EVERY 4 HOURS PRN
Status: DISCONTINUED | OUTPATIENT
Start: 2023-10-06 | End: 2023-10-07 | Stop reason: HOSPADM

## 2023-10-06 RX ORDER — DIPHENHYDRAMINE HCL 25 MG
25 CAPSULE ORAL EVERY 6 HOURS PRN
Status: DISCONTINUED | OUTPATIENT
Start: 2023-10-06 | End: 2023-10-07 | Stop reason: HOSPADM

## 2023-10-06 RX ORDER — SODIUM CHLORIDE 0.9 % (FLUSH) 0.9 %
10 SYRINGE (ML) INJECTION AS NEEDED
Status: DISCONTINUED | OUTPATIENT
Start: 2023-10-06 | End: 2023-10-07 | Stop reason: HOSPADM

## 2023-10-06 RX ADMIN — METOPROLOL TARTRATE 25 MG: 25 TABLET, FILM COATED ORAL at 20:28

## 2023-10-06 RX ADMIN — METRONIDAZOLE: 7.5 GEL TOPICAL at 20:49

## 2023-10-06 RX ADMIN — AMLODIPINE BESYLATE 5 MG: 5 TABLET ORAL at 18:19

## 2023-10-06 RX ADMIN — ASPIRIN 81 MG: 81 TABLET, COATED ORAL at 09:55

## 2023-10-06 RX ADMIN — ATORVASTATIN CALCIUM 80 MG: 40 TABLET, FILM COATED ORAL at 20:29

## 2023-10-06 RX ADMIN — VALSARTAN 160 MG: 80 TABLET, FILM COATED ORAL at 18:20

## 2023-10-06 RX ADMIN — MINOCYCLINE HYDROCHLORIDE 100 MG: 100 CAPSULE ORAL at 20:28

## 2023-10-06 RX ADMIN — ABACAVIR SULFATE, DOLUTEGRAVIR SODIUM, LAMIVUDINE 1 TABLET: 600; 50; 300 TABLET, FILM COATED ORAL at 18:19

## 2023-10-06 RX ADMIN — NITROGLYCERIN 5 MCG/MIN: 20 INJECTION INTRAVENOUS at 00:01

## 2023-10-06 RX ADMIN — HEPARIN SODIUM 13 UNITS/KG/HR: 10000 INJECTION, SOLUTION INTRAVENOUS at 09:52

## 2023-10-06 RX ADMIN — TICAGRELOR 90 MG: 90 TABLET ORAL at 21:19

## 2023-10-06 RX ADMIN — TICAGRELOR 180 MG: 90 TABLET ORAL at 00:01

## 2023-10-06 RX ADMIN — BUPROPION HYDROCHLORIDE 300 MG: 150 TABLET, EXTENDED RELEASE ORAL at 18:19

## 2023-10-06 RX ADMIN — METOPROLOL TARTRATE 25 MG: 25 TABLET, FILM COATED ORAL at 09:54

## 2023-10-06 RX ADMIN — HYDRALAZINE HYDROCHLORIDE 10 MG: 20 INJECTION INTRAMUSCULAR; INTRAVENOUS at 16:38

## 2023-10-06 RX ADMIN — ACETAMINOPHEN 650 MG: 325 TABLET, FILM COATED ORAL at 09:54

## 2023-10-06 RX ADMIN — HEPARIN SODIUM 12 UNITS/KG/HR: 10000 INJECTION, SOLUTION INTRAVENOUS at 00:06

## 2023-10-06 RX ADMIN — Medication 10 ML: at 20:29

## 2023-10-06 RX ADMIN — Medication 10 ML: at 09:55

## 2023-10-06 NOTE — PLAN OF CARE
Goal Outcome Evaluation:  Plan of Care Reviewed With: patient, mother        Progress: improving  Outcome Evaluation: patient s/p L heart cath with stent placcement,VSS, NAD.         Problem: Adult Inpatient Plan of Care  Goal: Plan of Care Review  Outcome: Ongoing, Progressing  Flowsheets (Taken 10/6/2023 3937)  Progress: improving  Plan of Care Reviewed With:   patient   mother  Outcome Evaluation: patient s/p L heart cath with stent placcement,VSS, NAD.
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
## 2023-10-06 NOTE — H&P
[facility] Medicine Services  History & Physical    Patient Name: Graham Clark  : 1965  MRN: [id number]  Primary Care Physician:  Ariel Celis MD  Date of admission: 10/5/2023    Subjective      Chief Complaint: chest pain    History of Present Illness: Graham Clark is a 58 y.o. male who presented to [facility] on 10/5/2023 complaining of chest pain in the center of his chest that started yesterday. He said that the took minocycline for an acne breakout and noticed and increase in his chest pain afterward. He became nauseated and vomited and felt some relief briefly. Patient denies any exertional symptoms or shortness of breath or feeling like he is going to pass out or cough, fever or diarrhea or abdominal pain. He has no previous heart history.     Sees Dr. Celis for history of mild depressive disorder, AIDS, and hypertension       ROS   Constitutional:  Negative for chills, fatigue and fever.   HENT:  Negative for congestion, sore throat, tinnitus and trouble swallowing.    Eyes:  Negative for photophobia, discharge and visual disturbance.   Respiratory:  Negative for cough and shortness of breath.    Cardiovascular:  Positive for chest pain. Negative for leg swelling.   Gastrointestinal:  Negative for abdominal pain, diarrhea, nausea and vomiting.   Genitourinary:  Negative for dysuria, flank pain and urgency.   Musculoskeletal:  Negative for arthralgias and myalgias.   Skin:  Negative for rash.   Neurological:  Negative for dizziness and headaches.   Psychiatric/Behavioral:  Negative for confusion.      Personal History     History reviewed. No pertinent past medical history.    History reviewed. No pertinent surgical history.    Family History: family history is not on file. Otherwise pertinent FHx was reviewed and not pertinent to current issue.    Social History:      Home Medications:  Prior to Admission Medications       None              Allergies:  No  Known Allergies    Objective      Vitals:   Temp:  [97.8 °F (36.6 °C)] 97.8 °F (36.6 °C)  Heart Rate:  [64-76] 66  Resp:  [16] 16  BP: (145-164)/() 153/98    Physical Exam   Vitals and nursing note reviewed.   Constitutional:       General: He is not in acute distress.     Appearance: He is well-developed. He is not diaphoretic.   HENT:      Head: Normocephalic and atraumatic.      Nose: Nose normal.      Mouth/Throat:      Pharynx: No oropharyngeal exudate.   Eyes:      Extraocular Movements: Extraocular movements intact.      Conjunctiva/sclera: Conjunctivae normal.      Pupils: Pupils are equal, round, and reactive to light.   Cardiovascular:      Rate and Rhythm: Normal rate and regular rhythm.      Heart sounds: Normal heart sounds.      Comments: S1, S2 audible.  Pulmonary:      Effort: Pulmonary effort is normal. No respiratory distress.      Breath sounds: Normal breath sounds. No wheezing, rhonchi or rales.      Comments: On room air.  Abdominal:      General: Bowel sounds are normal. There is no distension.      Palpations: Abdomen is soft.      Tenderness: There is no abdominal tenderness.   Musculoskeletal:         General: No tenderness or deformity. Normal range of motion.      Cervical back: Normal range of motion.      Right lower leg: No edema.      Left lower leg: No edema.   Skin:     General: Skin is warm.      Capillary Refill: Capillary refill takes less than 2 seconds.      Findings: No erythema or rash.   Neurological:      Mental Status: He is alert and oriented to person, place, and time.      Cranial Nerves: No cranial nerve deficit.   Psychiatric:         Mood and Affect: Mood normal.         Behavior: Behavior normal.     Result Review    Result Review:  I have personally reviewed the results from the time of this admission to 10/6/2023 00:04 EDT and agree with these findings:  [x]  Laboratory  []  Microbiology  [x]  Radiology  [x]  EKG/Telemetry   []  Cardiology/Vascular   []   Pathology  []  Old records  []  Other:      Assessment & Plan        Active Hospital Problems:  Active Hospital Problems    Diagnosis     **NSTEMI (non-ST elevated myocardial infarction)      Plan:     NSTEMI  -Dr. Godinez with cardiology was consulted by the ER physician about elevated troponin and EKG. He recommended nitroglycerin drip, heparinization, Brilinta as well as a statin and Lopressor. Orders were initiated in the ER  -Dr. Clay with interventional cardiology was also consulted and agrees with the plan of care.   -Will continue heparin and nitro drips and daily aspirin. Will defer DAPT to cardiology.  -NPO in anticipation of cardiac cath  -Continue telemetry  -PCU admission    DVT prophylaxis:  Medical DVT prophylaxis orders are present.    CODE STATUS:       Admission Status:  I believe this patient meets inpatient status.    Signature: Electronically signed by JERSEY Burks, 10/06/23, 00:04 EDT.  [facility] Hospitalist Team

## 2023-10-06 NOTE — PLAN OF CARE
Goal Outcome Evaluation:           Progress: no change  Outcome Evaluation: Pt admitted from the ED. Pt admitted with C/O CP & elevated trops. Pt denies CP at this time. Heparin & Nitro gtt's continue per orders. Pt is A&O x4. Pt currently NPO in anticipation of heart cath. No C/O voiced per pt at this time. Pt resting abed at this time. Call light within reach.        Problem: Adult Inpatient Plan of Care  Goal: Plan of Care Review  Outcome: Ongoing, Progressing  Flowsheets (Taken 10/6/2023 0331)  Progress: no change  Outcome Evaluation: Pt admitted from the ED. Pt admitted with C/O CP & elevated trops. Pt denies CP at this time. Heparin & Nitro gtt's continue per orders. Pt is A&O x4. Pt currently NPO in anticipation of heart cath. No C/O voiced per pt at this time. Pt resting abed at this time. Call light within reach.  Goal: Patient-Specific Goal (Individualized)  Outcome: Ongoing, Progressing  Goal: Absence of Hospital-Acquired Illness or Injury  Outcome: Ongoing, Progressing  Intervention: Identify and Manage Fall Risk  Recent Flowsheet Documentation  Taken 10/6/2023 0202 by Alexandre Blevins RN  Safety Promotion/Fall Prevention:   safety round/check completed   nonskid shoes/slippers when out of bed   fall prevention program maintained   clutter free environment maintained   assistive device/personal items within reach  Intervention: Prevent Skin Injury  Recent Flowsheet Documentation  Taken 10/6/2023 0202 by Alexandre Blevins, RN  Body Position: position changed independently  Skin Protection:   adhesive use limited   tubing/devices free from skin contact  Intervention: Prevent and Manage VTE (Venous Thromboembolism) Risk  Recent Flowsheet Documentation  Taken 10/6/2023 0202 by Alexandre Blevins RN  Activity Management: bedrest  Intervention: Prevent Infection  Recent Flowsheet Documentation  Taken 10/6/2023 0202 by Alexandre Blevins RN  Infection Prevention:   hand hygiene promoted   personal protective  equipment utilized   rest/sleep promoted   single patient room provided  Goal: Optimal Comfort and Wellbeing  Outcome: Ongoing, Progressing  Intervention: Provide Person-Centered Care  Recent Flowsheet Documentation  Taken 10/6/2023 0202 by Alexandre Blevins RN  Trust Relationship/Rapport:   care explained   choices provided   questions answered   questions encouraged   reassurance provided   thoughts/feelings acknowledged  Goal: Readiness for Transition of Care  Outcome: Ongoing, Progressing  Intervention: Mutually Develop Transition Plan  Recent Flowsheet Documentation  Taken 10/6/2023 0203 by Alexandre Blevins RN  Transportation Anticipated:   car, drives self   family or friend will provide  Patient/Family Anticipated Services at Transition: none  Patient/Family Anticipates Transition to: home  Taken 10/6/2023 0202 by Alexandre Blevins, RN  Equipment Currently Used at Home: none     Problem: Behavioral Health Comorbidity  Goal: Maintenance of Behavioral Health Symptom Control  Outcome: Ongoing, Progressing  Intervention: Maintain Behavioral Health Symptom Control  Recent Flowsheet Documentation  Taken 10/6/2023 0202 by Alexandre Blevins, RN  Medication Review/Management: medications reviewed     Problem: Hypertension Comorbidity  Goal: Blood Pressure in Desired Range  Outcome: Ongoing, Progressing  Intervention: Maintain Blood Pressure Management  Recent Flowsheet Documentation  Taken 10/6/2023 0202 by Alexandre Blevins, RN  Medication Review/Management: medications reviewed

## 2023-10-06 NOTE — CASE MANAGEMENT/SOCIAL WORK
Discharge Planning Assessment  [facility]     Patient Name: Graham Clark  MRN: [id number]  Today's Date: 10/6/2023    Admit Date: 10/5/2023    Plan: Return home.   Discharge Needs Assessment       Row Name 10/06/23 1602       Living Environment    People in Home alone    Current Living Arrangements home    Potentially Unsafe Housing Conditions none    Primary Care Provided by self    Provides Primary Care For no one    Family Caregiver if Needed parent(s)    Family Caregiver Names mother    Quality of Family Relationships helpful;involved;supportive    Able to Return to Prior Arrangements yes       Resource/Environmental Concerns    Resource/Environmental Concerns none    Transportation Concerns none       Transition Planning    Patient/Family Anticipates Transition to home    Patient/Family Anticipated Services at Transition none       Discharge Needs Assessment    Readmission Within the Last 30 Days no previous admission in last 30 days    Equipment Currently Used at Home none    Concerns to be Addressed denies needs/concerns at this time    Provided Post Acute Provider List? N/A    Provided Post Acute Provider Quality & Resource List? N/A                   Discharge Plan       Row Name 10/06/23 1601       Plan    Plan Return home.    Plan Comments CM met with patient and mother at the bedside. Confirmed PCP, insurance, and pharmacy. Patient denies any difficulty affording medications. Patient is not current with any HHC/OPPT/OT services. Patient lives at home alone, and still drives. Patient drove to the hospital but has family/friends who can drive him at discharge if needed. Denies further CM needs at this time. DC Barriers: cardiac cath with intervention today.                   Demographic Summary       Row Name 10/06/23 1600       General Information    Admission Type observation    Arrived From emergency department    Referral Source emergency department    Reason for Consult care coordination/care  conference;discharge planning    Preferred Language English       Contact Information    Permission Granted to Share Info With     Contact Information Obtained for                    Functional Status       Row Name 10/06/23 1603       Functional Status    Usual Activity Tolerance good    Current Activity Tolerance good       Physical Activity    On average, how many days per week do you engage in moderate to strenuous exercise (like a brisk walk)? 6 days    On average, how many minutes do you engage in exercise at this level? 60 min    Number of minutes of exercise per week 360       Functional Status, IADL    Medications independent    Meal Preparation independent    Housekeeping independent    Laundry independent    Shopping independent                Brandon Calderón RN     Office Phone: [phone]

## 2023-10-07 ENCOUNTER — READMISSION MANAGEMENT (OUTPATIENT)
Dept: CALL CENTER | Facility: HOSPITAL | Age: 58
End: 2023-10-07
Payer: MEDICARE

## 2023-10-07 VITALS
HEIGHT: 70 IN | OXYGEN SATURATION: 99 % | DIASTOLIC BLOOD PRESSURE: 82 MMHG | SYSTOLIC BLOOD PRESSURE: 121 MMHG | HEART RATE: 76 BPM | WEIGHT: 156.2 LBS | BODY MASS INDEX: 22.36 KG/M2 | RESPIRATION RATE: 12 BRPM | TEMPERATURE: 97.7 F

## 2023-10-07 PROBLEM — I21.9 TYPE 1 MYOCARDIAL INFARCTION: Status: ACTIVE | Noted: 2023-10-07

## 2023-10-07 LAB
ANION GAP SERPL CALCULATED.3IONS-SCNC: 9 MMOL/L (ref 5–15)
BASOPHILS # BLD AUTO: 0 10*3/MM3 (ref 0–0.2)
BASOPHILS NFR BLD AUTO: 0.4 % (ref 0–1.5)
BUN SERPL-MCNC: 9 MG/DL (ref 6–20)
BUN/CREAT SERPL: 9.8 (ref 7–25)
CALCIUM SPEC-SCNC: 9.4 MG/DL (ref 8.6–10.5)
CHLORIDE SERPL-SCNC: 102 MMOL/L (ref 98–107)
CO2 SERPL-SCNC: 26 MMOL/L (ref 22–29)
CREAT SERPL-MCNC: 0.92 MG/DL (ref 0.76–1.27)
DEPRECATED RDW RBC AUTO: 49 FL (ref 37–54)
EGFRCR SERPLBLD CKD-EPI 2021: 97 ML/MIN/1.73
EOSINOPHIL # BLD AUTO: 0.2 10*3/MM3 (ref 0–0.4)
EOSINOPHIL NFR BLD AUTO: 3.8 % (ref 0.3–6.2)
ERYTHROCYTE [DISTWIDTH] IN BLOOD BY AUTOMATED COUNT: 14.9 % (ref 12.3–15.4)
GEN 5 2HR TROPONIN T REFLEX: 609 NG/L
GLUCOSE SERPL-MCNC: 100 MG/DL (ref 65–99)
HCT VFR BLD AUTO: 42.5 % (ref 37.5–51)
HGB BLD-MCNC: 14 G/DL (ref 13–17.7)
LYMPHOCYTES # BLD AUTO: 1.2 10*3/MM3 (ref 0.7–3.1)
LYMPHOCYTES NFR BLD AUTO: 23.8 % (ref 19.6–45.3)
MCH RBC QN AUTO: 31.7 PG (ref 26.6–33)
MCHC RBC AUTO-ENTMCNC: 33 G/DL (ref 31.5–35.7)
MCV RBC AUTO: 96.2 FL (ref 79–97)
MONOCYTES # BLD AUTO: 0.4 10*3/MM3 (ref 0.1–0.9)
MONOCYTES NFR BLD AUTO: 8.4 % (ref 5–12)
NEUTROPHILS NFR BLD AUTO: 3.2 10*3/MM3 (ref 1.7–7)
NEUTROPHILS NFR BLD AUTO: 63.6 % (ref 42.7–76)
NRBC BLD AUTO-RTO: 0.2 /100 WBC (ref 0–0.2)
PLATELET # BLD AUTO: 220 10*3/MM3 (ref 140–450)
PMV BLD AUTO: 8.2 FL (ref 6–12)
POTASSIUM SERPL-SCNC: 4.1 MMOL/L (ref 3.5–5.2)
QT INTERVAL: 408 MS
QT INTERVAL: 437 MS
QTC INTERVAL: 454 MS
QTC INTERVAL: 463 MS
RBC # BLD AUTO: 4.42 10*6/MM3 (ref 4.14–5.8)
SODIUM SERPL-SCNC: 137 MMOL/L (ref 136–145)
TROPONIN T DELTA: -20 NG/L
TROPONIN T SERPL HS-MCNC: 629 NG/L
WBC NRBC COR # BLD: 5 10*3/MM3 (ref 3.4–10.8)

## 2023-10-07 PROCEDURE — 84484 ASSAY OF TROPONIN QUANT: CPT | Performed by: INTERNAL MEDICINE

## 2023-10-07 PROCEDURE — 80048 BASIC METABOLIC PNL TOTAL CA: CPT | Performed by: INTERNAL MEDICINE

## 2023-10-07 PROCEDURE — 93005 ELECTROCARDIOGRAM TRACING: CPT | Performed by: INTERNAL MEDICINE

## 2023-10-07 PROCEDURE — 85025 COMPLETE CBC W/AUTO DIFF WBC: CPT | Performed by: INTERNAL MEDICINE

## 2023-10-07 RX ORDER — ATORVASTATIN CALCIUM 10 MG/1
10 TABLET, FILM COATED ORAL DAILY
Qty: 90 TABLET | Refills: 0 | Status: SHIPPED | OUTPATIENT
Start: 2023-10-07

## 2023-10-07 RX ORDER — METOPROLOL SUCCINATE 25 MG/1
25 TABLET, EXTENDED RELEASE ORAL DAILY
Qty: 30 TABLET | Refills: 0 | Status: SHIPPED | OUTPATIENT
Start: 2023-10-07

## 2023-10-07 RX ORDER — NITROGLYCERIN 0.4 MG/1
0.4 TABLET SUBLINGUAL
Qty: 30 TABLET | Refills: 12 | Status: SHIPPED | OUTPATIENT
Start: 2023-10-07

## 2023-10-07 RX ORDER — NICOTINE 21 MG/24HR
1 PATCH, TRANSDERMAL 24 HOURS TRANSDERMAL DAILY PRN
Qty: 30 EACH | Refills: 0 | Status: SHIPPED | OUTPATIENT
Start: 2023-10-07

## 2023-10-07 RX ORDER — ASPIRIN 81 MG/1
81 TABLET ORAL DAILY
Qty: 30 TABLET | Refills: 0 | Status: SHIPPED | OUTPATIENT
Start: 2023-10-07

## 2023-10-07 RX ADMIN — ASPIRIN 81 MG: 81 TABLET ORAL at 10:27

## 2023-10-07 RX ADMIN — ABACAVIR SULFATE, DOLUTEGRAVIR SODIUM, LAMIVUDINE 1 TABLET: 600; 50; 300 TABLET, FILM COATED ORAL at 11:02

## 2023-10-07 RX ADMIN — BUPROPION HYDROCHLORIDE 300 MG: 150 TABLET, EXTENDED RELEASE ORAL at 10:12

## 2023-10-07 RX ADMIN — ASPIRIN 81 MG: 81 TABLET, COATED ORAL at 10:12

## 2023-10-07 RX ADMIN — AMLODIPINE BESYLATE 5 MG: 5 TABLET ORAL at 10:41

## 2023-10-07 RX ADMIN — MINOCYCLINE HYDROCHLORIDE 100 MG: 100 CAPSULE ORAL at 11:02

## 2023-10-07 RX ADMIN — METRONIDAZOLE: 7.5 GEL TOPICAL at 10:29

## 2023-10-07 RX ADMIN — METOPROLOL TARTRATE 25 MG: 25 TABLET, FILM COATED ORAL at 10:12

## 2023-10-07 RX ADMIN — Medication 10 ML: at 10:15

## 2023-10-07 RX ADMIN — TICAGRELOR 90 MG: 90 TABLET ORAL at 10:41

## 2023-10-07 RX ADMIN — VALSARTAN 160 MG: 80 TABLET, FILM COATED ORAL at 10:12

## 2023-10-07 NOTE — PROGRESS NOTES
Cardiology Progress Note      PATIENT IDENTIFICATION    Name: Graham Clark  Age: 58 y.o. Sex: male : 1965  MRN: [id number]    Requesting Provider    Isma Yusuf MD     LOS: 1 day       Reason For Followup:  Coronary artery disease  Non-STEMI      Subjective:    Interval History:  Seen examined.  Chart labs reviewed.  Patient denies any chest pain pressure heaviness or tightness.  No shortness of breath.  Has a little bit of groin discomfort at catheterization site.  No swelling or hematoma noted.    Discussed the importance of antiplatelet therapy.  Discussed activity and exercise recommendations.    Review of Systems:  A complete review of systems was undertaken with the pertinent cardiovascular findings listed in history of present illness and all other systems being negative.     Assessment & Plan    Impressions:  Non-STEMI  Coronary artery disease status post PCI REJI circumflex 10/6/2023  Hypertension    Recommendations:  Cardiology status appropriate for discharge next destination of care when okay with other services  Continue with dual antiplatelet therapy consisting of aspirin and ticagrelor  Change metoprolol tartrate to metoprolol succinate 25 mg daily  No driving until Tuesday  No extensive exercise until follow-up in office in 2 weeks        Objective:    Medication Review:   Scheduled Meds:Abacavir-Dolutegravir-Lamivud, 1 tablet, Oral, Daily  amLODIPine, 5 mg, Oral, Q24H   And  valsartan, 160 mg, Oral, Q24H  aspirin, 81 mg, Oral, Daily  aspirin, 81 mg, Oral, Daily  atorvastatin, 80 mg, Oral, Nightly  buPROPion XL, 300 mg, Oral, Daily  metoprolol tartrate, 25 mg, Oral, Q12H  metroNIDAZOLE, , Topical, BID  minocycline, 100 mg, Oral, Q12H  senna-docusate sodium, 2 tablet, Oral, BID  sodium chloride, 10 mL, Intravenous, Q12H  ticagrelor, 90 mg, Oral, BID      Continuous Infusions:heparin, 12 Units/kg/hr, Last Rate: Stopped (10/06/23 1130)  nitroglycerin, 5-200 mcg/min, Last Rate:  Stopped (10/06/23 1130)      PRN Meds:.  acetaminophen **OR** acetaminophen **OR** acetaminophen    acetaminophen    aluminum-magnesium hydroxide-simethicone    aluminum-magnesium hydroxide-simethicone    atropine    senna-docusate sodium **AND** polyethylene glycol **AND** bisacodyl **AND** bisacodyl    Calcium Replacement - Follow Nurse / BPA Driven Protocol    diphenhydrAMINE    heparin    heparin    hydrALAZINE    Magnesium Standard Dose Replacement - Follow Nurse / BPA Driven Protocol    melatonin    nicotine    nicotine polacrilex    nitroglycerin    ondansetron **OR** ondansetron    ondansetron **OR** ondansetron    Phosphorus Replacement - Follow Nurse / BPA Driven Protocol    Potassium Replacement - Follow Nurse / BPA Driven Protocol    sodium chloride    sodium chloride    sodium chloride    sodium chloride      NSTEMI (non-ST elevated myocardial infarction)    Chest pain    Abnormal EKG    Type 1 myocardial infarction         Physical Exam:    General: Alert, cooperative, no distress, appears stated age  Head:  Normocephalic, atraumatic, mucous membranes moist  Eyes:  Conjunctivae/corneas clear, EOMs intact     Neck:  Supple,  no bruit  Lungs:  Clear to auscultation bilaterally, no wheezes, rhonchi or rales are noted  Chest wall: No tenderness  Heart::  Regular rate and rhythm, S1 and S2 normal, 1/6 holosystolic murmur.  No rub or gallop  Abdomen: Soft, nontender, nondistended, bowel sounds active  Extremities: No cyanosis, clubbing, or edema.  Groin soft no hematoma  Pulses: 2+ and symmetric all extremities  Skin:  No rashes or lesions  Neuro/psych: A&O x3. CN II through XII are grossly intact with appropriate affect    Vital Signs:  Vitals:    10/06/23 2100 10/07/23 0100 10/07/23 0510 10/07/23 0936   BP: 139/78 125/88  128/81   BP Location:  Right arm  Right arm   Patient Position:  Lying  Lying   Pulse: 74 67 76    Resp:  16 25 14   Temp:  98 °F (36.7 °C) 97.8 °F (36.6 °C) 97.4 °F (36.3 °C)   TempSrc:   "Oral Oral Oral   SpO2: 99% 97%     Weight:   70.9 kg (156 lb 3.2 oz)    Height:         Wt Readings from Last 1 Encounters:   10/07/23 70.9 kg (156 lb 3.2 oz)       Intake/Output Summary (Last 24 hours) at 10/7/2023 1300  Last data filed at 10/7/2023 0100  Gross per 24 hour   Intake 340 ml   Output 1220 ml   Net -880 ml         Results Review:     CBC    Results from last 7 days   Lab Units 10/07/23  0040 10/06/23  0640 10/05/23  2140   WBC 10*3/mm3 5.00 4.00 5.00   HEMOGLOBIN g/dL 14.0 13.6 14.0   PLATELETS 10*3/mm3 220 203 196     Cr Clearance Estimated Creatinine Clearance: 88.8 mL/min (by C-G formula based on SCr of 0.92 mg/dL).  Coag   Results from last 7 days   Lab Units 10/06/23  0640 10/05/23  2349 10/05/23  2140   INR   --  0.98  --    APTT seconds 48.5* 25.3* 25.4*     HbA1C   Lab Results   Component Value Date    HGBA1C 5.4 08/10/2021     Blood Glucose No results found for: \"POCGLU\"  Infection     CMP   Results from last 7 days   Lab Units 10/07/23  0040 10/06/23  0640 10/05/23  2140   SODIUM mmol/L 137 138 137   POTASSIUM mmol/L 4.1 3.7 3.9   CHLORIDE mmol/L 102 103 103   CO2 mmol/L 26.0 25.0 24.0   BUN mg/dL 9 9 12   CREATININE mg/dL 0.92 0.87 0.93   GLUCOSE mg/dL 100* 96 119*   ALBUMIN g/dL  --   --  4.0   BILIRUBIN mg/dL  --   --  0.4   ALK PHOS U/L  --   --  56   AST (SGOT) U/L  --   --  43*   ALT (SGPT) U/L  --   --  26     ABG      UA      CALIXTO  No results found for: \"POCMETH\", \"POCAMPHET\", \"POCBARBITUR\", \"POCBENZO\", \"POCCOCAINE\", \"POCOPIATES\", \"POCOXYCODO\", \"POCPHENCYC\", \"POCPROPOXY\", \"POCTHC\", \"POCTRICYC\"  Lysis Labs   Results from last 7 days   Lab Units 10/07/23  0040 10/06/23  0640 10/05/23  2349 10/05/23  2140   INR   --   --  0.98  --    APTT seconds  --  48.5* 25.3* 25.4*   HEMOGLOBIN g/dL 14.0 13.6  --  14.0   PLATELETS 10*3/mm3 220 203  --  196   CREATININE mg/dL 0.92 0.87  --  0.93     Radiology(recent) XR Chest 1 View    Result Date: 10/5/2023  1.No evidence for acute cardiopulmonary " process. Electronically Signed: Dario Mark MD  10/5/2023 9:59 PM EDT  Workstation ID: [id number]       Results from last 7 days   Lab Units 10/07/23  0323   HSTROP T ng/L 609*       Imaging Results (Last 24 Hours)       ** No results found for the last 24 hours. **            Cardiac Studies:  Echo- Results for orders placed during the hospital encounter of 10/05/23    Adult Transthoracic Echo Complete W/ Cont if Necessary Per Protocol    Interpretation Summary    Left ventricular systolic function is low normal. Left ventricular ejection fraction appears to be 51 - 55%.    Left ventricular wall thickness is consistent with mild concentric hypertrophy.    Left ventricular diastolic function is consistent with (grade I) impaired relaxation.    Estimated right ventricular systolic pressure from tricuspid regurgitation is normal (<35 mmHg).    Stress Myoview-  Cath-        Lei Clay DO  10/07/23  13:00 EDT

## 2023-10-07 NOTE — PLAN OF CARE
Problem: Adult Inpatient Plan of Care  Goal: Plan of Care Review  Outcome: Ongoing, Progressing  Goal: Patient-Specific Goal (Individualized)  Outcome: Ongoing, Progressing  Goal: Absence of Hospital-Acquired Illness or Injury  Outcome: Ongoing, Progressing  Intervention: Identify and Manage Fall Risk  Intervention: Prevent Skin Injury  Intervention: Prevent and Manage VTE (Venous Thromboembolism) Risk  Intervention: Prevent Infection  Goal: Optimal Comfort and Wellbeing  Outcome: Ongoing, Progressing  Intervention: Provide Person-Centered Care  Goal: Readiness for Transition of Care  Outcome: Ongoing, Progressing     Problem: Behavioral Health Comorbidity  Goal: Maintenance of Behavioral Health Symptom Control  Outcome: Ongoing, Progressing  Intervention: Maintain Behavioral Health Symptom Control     Problem: Hypertension Comorbidity  Goal: Blood Pressure in Desired Range  Outcome: Ongoing, Progressing  Intervention: Maintain Blood Pressure Management   Goal Outcome Evaluation:  Plan of Care Reviewed With: patient        Progress: improving  Outcome Evaluation: Pt recovering post Left heart cath with stent placement. site is C/D/I and soft. Denies any concerns at this time.

## 2023-10-07 NOTE — CASE MANAGEMENT/SOCIAL WORK
Case Management Discharge Note      Final Note: Home    Transportation Services  Private: Car    Final Discharge Disposition Code: 01 - home or self-care
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
## 2023-10-07 NOTE — PROGRESS NOTES
[facility] Daily Progress Note    Patient Name: Graham Clark  : 1965  MRN: [id number]  Primary Care Physician:  Ariel Celis MD  Date of admission: 10/5/2023      Subjective      Chief Complaint: chest pain     History of Present Illness: Graham Clark is a 58 y.o. male who presented to [facility] on 10/5/2023 complaining of chest pain in the center of his chest that started yesterday. He said that the took minocycline for an acne breakout and noticed and increase in his chest pain afterward. He became nauseated and vomited and felt some relief briefly. Patient denies any exertional symptoms or shortness of breath or feeling like he is going to pass out or cough, fever or diarrhea or abdominal pain. He has no previous heart history.      Sees Dr. Celis for history of mild depressive disorder, AIDS, and hypertension     10/7/23 seen in bed NAD, says feels better anxious tp go home. DW RN      ROS Constitutional:  Negative for chills, fatigue and fever.   HENT:  Negative for congestion, sore throat, tinnitus and trouble swallowing.    Eyes:  Negative for photophobia, discharge and visual disturbance.   Respiratory:  Negative for cough and shortness of breath.    Cardiovascular:  Positive for chest pain. Negative for leg swelling.   Gastrointestinal:  Negative for abdominal pain, diarrhea, nausea and vomiting.   Genitourinary:  Negative for dysuria, flank pain and urgency.   Musculoskeletal:  Negative for arthralgias and myalgias.   Skin:  Negative for rash.   Neurological:  Negative for dizziness and headaches.   Psychiatric/Behavioral:  Negative for confusion.        Objective      Vitals:   Temp:  [97.8 °F (36.6 °C)-98.3 °F (36.8 °C)] 97.8 °F (36.6 °C)  Heart Rate:  [55-95] 76  Resp:  [16-25] 25  BP: (117-164)/() 125/88    Physical Exam Constitutional:       General: He is not in acute distress.     Appearance: He is  well-developed. He is not diaphoretic.   HENT:      Head: Normocephalic and atraumatic.      Nose: Nose normal.      Mouth/Throat:      Pharynx: No oropharyngeal exudate.   Eyes:      Extraocular Movements: Extraocular movements intact.      Conjunctiva/sclera: Conjunctivae normal.      Pupils: Pupils are equal, round, and reactive to light.   Cardiovascular:      Rate and Rhythm: Normal rate and regular rhythm.      Heart sounds: Normal heart sounds.      Comments: S1, S2 audible.  Pulmonary:      Effort: Pulmonary effort is normal. No respiratory distress.      Breath sounds: Normal breath sounds. No wheezing, rhonchi or rales.      Comments: On room air.  Abdominal:      General: Bowel sounds are normal. There is no distension.      Palpations: Abdomen is soft.      Tenderness: There is no abdominal tenderness.   Musculoskeletal:         General: No tenderness or deformity. Normal range of motion.      Cervical back: Normal range of motion.      Right lower leg: No edema.      Left lower leg: No edema.   Skin:     General: Skin is warm.      Capillary Refill: Capillary refill takes less than 2 seconds.      Findings: No erythema or rash.   Neurological:      Mental Status: He is alert and oriented to person, place, and time.      Cranial Nerves: No cranial nerve deficit.   Psychiatric:         Mood and Affect: Mood normal.         Behavior: Behavior normal.        Result Review    Result Review:  I have personally reviewed the results from the time of this admission to 10/7/2023 09:18 EDT and agree with these findings:  []  Laboratory  []  Microbiology  []  Radiology  []  EKG/Telemetry   []  Cardiology/Vascular   []  Pathology  []  Old records  []  Other:  Most notable findings include:         CBC:      Lab 10/07/23  0040 10/06/23  0640 10/05/23  2140   WBC 5.00 4.00 5.00   HEMOGLOBIN 14.0 13.6 14.0   HEMATOCRIT 42.5 41.7 42.4   PLATELETS 220 203 196   NEUTROS ABS 3.20 1.90 3.00   LYMPHS ABS 1.20 1.40 1.30  "  MONOS ABS 0.40 0.40 0.50   EOS ABS 0.20 0.30 0.20   MCV 96.2 96.8 95.2     CMP:        Lab 10/07/23  0040 10/06/23  0640 10/05/23  2140   SODIUM 137 138 137   POTASSIUM 4.1 3.7 3.9   CHLORIDE 102 103 103   CO2 26.0 25.0 24.0   ANION GAP 9.0 10.0 10.0   BUN 9 9 12   CREATININE 0.92 0.87 0.93   EGFR 97.0 100.6 95.8   GLUCOSE 100* 96 119*   CALCIUM 9.4 8.7 9.1   TOTAL PROTEIN  --   --  7.1   ALBUMIN  --   --  4.0   GLOBULIN  --   --  3.1   ALT (SGPT)  --   --  26   AST (SGOT)  --   --  43*   BILIRUBIN  --   --  0.4   ALK PHOS  --   --  56     No results found for: \"ACANTHNAEG\", \"AFBCX\", \"BPERTUSSISCX\", \"BLOODCX\"  No results found for: \"BCIDPCR\", \"CXREFLEX\", \"CSFCX\", \"CULTURETIS\"  No results found for: \"CULTURES\", \"HSVCX\", \"URCX\"  No results found for: \"EYECULTURE\", \"GCCX\", \"HSVCULTURE\", \"LABHSV\"  No results found for: \"LEGIONELLA\", \"MRSACX\", \"MUMPSCX\", \"MYCOPLASCX\"  No results found for: \"NOCARDIACX\", \"STOOLCX\"  No results found for: \"THROATCX\", \"UNSTIMCULT\", \"URINECX\", \"CULTURE\", \"VZVCULTUR\"  No results found for: \"VIRALCULTU\", \"WOUNDCX\"      Assessment & Plan      Brief Patient Summary:  Graham Clark is a 58 y.o. male who       Abacavir-Dolutegravir-Lamivud, 1 tablet, Oral, Daily  amLODIPine, 5 mg, Oral, Q24H   And  valsartan, 160 mg, Oral, Q24H  aspirin, 81 mg, Oral, Daily  aspirin, 81 mg, Oral, Daily  atorvastatin, 80 mg, Oral, Nightly  buPROPion XL, 300 mg, Oral, Daily  metoprolol tartrate, 25 mg, Oral, Q12H  metroNIDAZOLE, , Topical, BID  minocycline, 100 mg, Oral, Q12H  senna-docusate sodium, 2 tablet, Oral, BID  sodium chloride, 10 mL, Intravenous, Q12H  ticagrelor, 90 mg, Oral, BID       heparin, 12 Units/kg/hr, Last Rate: Stopped (10/06/23 1130)  nitroglycerin, 5-200 mcg/min, Last Rate: Stopped (10/06/23 1130)         Active Hospital Problems:  Active Hospital Problems    Diagnosis     **NSTEMI (non-ST elevated myocardial infarction)     Chest pain     Abnormal EKG      NSTEMI  -Dr. Godinez with " cardiology was consulted by the ER physician about elevated troponin and EKG. He recommended nitroglycerin drip, heparinization, Brilinta as well as a statin and Lopressor. Orders were initiated in the ER  -Dr. Clay with interventional cardiology was also consulted and agrees with the plan of care.   -Will continue heparin and nitro drips and daily aspirin. Will defer DAPT to cardiology.  -NPO in anticipation of cardiac cath  -Continue telemetry  -PCU admission      DVT prophylaxis:  Medical DVT prophylaxis orders are present.    CODE STATUS:    Level Of Support Discussed With: Patient  Code Status (Patient has no pulse and is not breathing): CPR (Attempt to Resuscitate)  Medical Interventions (Patient has pulse or is breathing): Full Support      Disposition:  I expect patient to be discharged home.    I have utilized all available, immediate resources to obtain, update, or review the patient's current medications including all prescriptions, over-the-counter products, herbals, cannabis/cannabidiol products, and vitamin/mineral/dietary (nutritional) supplements.      Level Of Support Discussed With: Patient  Code Status (Patient has no pulse and is not breathing): CPR (Attempt to Resuscitate)  Medical Interventions (Patient has pulse or is breathing): Full Support    Next of kin or Power of :     Admission Status:  I believe this patient meets admit status.      Electronically signed by Isma Yusuf MD, 10/07/23, 09:18 EDT.  [facility] Hospitalist Team

## 2023-10-08 NOTE — OUTREACH NOTE
Prep Survey      Flowsheet Row Responses   [facility] facility patient discharged from? [facility]   Is LACE score < 7 ? Yes   Eligibility Readm Mgmt   Discharge diagnosis NSTEMI (non-ST elevated myocardial infarction)   Does the patient have one of the following disease processes/diagnoses(primary or secondary)? Acute MI (STEMI,NSTEMI)   Does the patient have Home health ordered? No   Is there a DME ordered? No   Medication alerts for this patient START taking:  aspirin  atorvastatin (Lipitor)  metoprolol succinate XL (Toprol XL)  nicotine (NICODERM CQ)  nitroglycerin (NITROSTAT)  ticagrelor (BRILINTA)   Prep survey completed? Yes            Sindi MONSON - Registered Nurse

## 2023-10-09 ENCOUNTER — TELEPHONE (OUTPATIENT)
Dept: CARDIOLOGY | Facility: CLINIC | Age: 58
End: 2023-10-09

## 2023-10-09 NOTE — PROGRESS NOTES
"Enter Query Response Below      Query Response: Type 1 MI secondary to plaque rupture    Electronically signed by Lei Clay DO, 10/09/23, 4:11 PM EDT.               If applicable, please update the problem list.     Patient: Graham Bustos        : 1965  Account: [id number]           Admit Date: 10/5/2023        How to Respond to this query:       a. Click New Note     b. Answer query within the yellow box.                c. Update the Problem List, if applicable.      If you have any questions about this query contact me at:  [email or web address]    :    88 year old admitted with NSTEMI. taken to cath lab and note  \"Successful PCI REJI posterior lateral branch of circumflex with a 2.5 x 33 Xience drug-eluting stent, Normal left ventricular systolic function ejection fraction 55 to 60%\" Placed on Dual antiplatelet therapy consisting of aspirin and ticagrelor.  Note troponin 94 on admission 10/5/23, Troponin climbed to 629 on 10/7/23.  Please clarify the type of NSTEMI the patient was treated/monitored for:    Type 1 MI due to ______(please specify- plaque erosion, rupture, fissure or thrombus)  Type 2 MI due to _______  Other- specify______  Unable to determine      By submitting this query, we are merely seeking further clarification of documentation to accurately reflect all conditions that you are monitoring, evaluating, treating or that extend the hospitalization or utilize additional resources of care. Please utilize your independent clinical judgment when addressing the question(s) above.     This query and your response, once completed, will be entered into the legal medical record.    Sincerely,  Donna Jiang RN  CDI  Clinical Documentation Integrity Program   "

## 2023-10-09 NOTE — TELEPHONE ENCOUNTER
Caller: Graham Clark    Relationship to patient: Self    Best call back number: [phone]      Type of visit: HOSPITAL FOLLOW UP    Requested date: 2 WEEKS        Additional notes:PT HAD PROCEDURE ON 10.7.23 WITH DR BERNARD AND WAS ADVISED TO FOLLOW UP IN 2 WEEKS PER DISCHARGE SUMMARY.      PT IS NOT CURRENTLY HAVING ANY CARDIAC ISSUES

## 2023-10-12 ENCOUNTER — READMISSION MANAGEMENT (OUTPATIENT)
Dept: CALL CENTER | Facility: HOSPITAL | Age: 58
End: 2023-10-12
Payer: MEDICARE

## 2023-10-14 LAB
QT INTERVAL: 408 MS
QT INTERVAL: 437 MS
QTC INTERVAL: 454 MS
QTC INTERVAL: 463 MS

## 2024-05-09 NOTE — OUTREACH NOTE
AMI Week 1 Survey      Flowsheet Row Responses   [facility] patient discharged from? [facility]   Does the patient have one of the following disease processes/diagnoses(primary or secondary)? Acute MI (STEMI,NSTEMI)   Week 1 attempt successful? Yes   Call start time 1012   Call end time 1020   Discharge diagnosis NSTEMI (non-ST elevated myocardial infarction)   Meds reviewed with patient/caregiver? Yes   Is the patient having any side effects they believe may be caused by any medication additions or changes? No   Does the patient have all prescriptions related to this admission filled (includes statins,anticoagulants,HTN meds,anti-arrhythmia meds) Yes   Is the patient taking all medications as directed (includes completed medication regime)? Yes   Does the patient have a primary care provider?  Yes   Does the patient have an appointment with their PCP,cardiologist,or clinic within 7 days of discharge? Yes   Has the patient kept scheduled appointments due by today? Yes   Has home health visited the patient within 72 hours of discharge? N/A   Psychosocial issues? No   Comments /86 HR 79 this morning   Did the patient receive a copy of their discharge instructions? Yes   Nursing interventions Reviewed instructions with patient   What is the patient's perception of their health status since discharge? Improving   Nursing interventions Nurse provided patient education   Is the patient/caregiver able to teach back signs and symptoms of when to call for help immediately: Sudden chest discomfort, Sudden discomfort in arms, back, neck or jaw, Shortness of breath at any time, Irregular or rapid heart rate, Dizziness or lightheadedness   Nursing interventions Nurse provided patient education   Is the pateint /caregiver able to teach back the importance of cardiac rehab? Yes   Is the patient/caregiver able to teach back lifestyle changes to help prevent MIs Heart healthy diet, Maintaining a healthy weight   Is the  patient/caregiver able to teach back ways to prevent a second heart attack: Take medications, Follow up with MD, Participate in Cardiac Rehab   Is the patient/caregiver able to teach back the hierarchy of who to call/visit for symptoms/problems? PCP, Specialist, Home health nurse, Urgent Care, ED, 911 Yes   Week 1 call completed? Yes   Revoked No further contact(revokes)-requires comment   Is the patient interested in additional calls from an ambulatory ? No   Would this patient benefit from a Referral to [facility] Social Work? No   Graduated/Revoked comments Pt reports doing well, no concerns at this time   Call end time 1020            Nicki TODD - Registered Nurse   Yes

## 2025-08-27 ENCOUNTER — HOSPITAL ENCOUNTER (INPATIENT)
Facility: HOSPITAL | Age: 60
LOS: 1 days | Discharge: HOME | End: 2025-08-28
Attending: EMERGENCY MEDICINE | Admitting: STUDENT IN AN ORGANIZED HEALTH CARE EDUCATION/TRAINING PROGRAM
Payer: MEDICARE

## 2025-08-27 ENCOUNTER — APPOINTMENT (OUTPATIENT)
Dept: ULTRASOUND IMAGING | Facility: HOSPITAL | Age: 60
End: 2025-08-27
Payer: MEDICARE

## 2025-08-27 DIAGNOSIS — N45.3 ORCHITIS AND EPIDIDYMITIS: Primary | ICD-10-CM

## 2025-08-27 DIAGNOSIS — A41.9 SEPSIS, DUE TO UNSPECIFIED ORGANISM, UNSPECIFIED WHETHER ACUTE ORGAN DYSFUNCTION PRESENT: ICD-10-CM

## 2025-08-27 LAB
ALBUMIN SERPL-MCNC: 4.6 G/DL (ref 3.5–5.2)
ALBUMIN/GLOB SERPL: 1.2 G/DL
ALP SERPL-CCNC: 65 U/L (ref 39–117)
ALT SERPL W P-5'-P-CCNC: 22 U/L (ref 1–41)
ANION GAP SERPL CALCULATED.3IONS-SCNC: 14.3 MMOL/L (ref 5–15)
AST SERPL-CCNC: 30 U/L (ref 1–40)
BACTERIA UR QL AUTO: ABNORMAL /HPF
BASOPHILS # BLD AUTO: 0.03 10*3/MM3 (ref 0–0.2)
BASOPHILS NFR BLD AUTO: 0.2 % (ref 0–1.5)
BILIRUB SERPL-MCNC: 0.9 MG/DL (ref 0–1.2)
BILIRUB UR QL STRIP: NEGATIVE
BUN SERPL-MCNC: 11.4 MG/DL (ref 6–20)
BUN/CREAT SERPL: 10.3 (ref 7–25)
C TRACH DNA SPEC QL NAA+PROBE: NOT DETECTED
CALCIUM SPEC-SCNC: 9.5 MG/DL (ref 8.6–10.5)
CHLORIDE SERPL-SCNC: 95 MMOL/L (ref 98–107)
CLARITY UR: CLEAR
CO2 SERPL-SCNC: 24.7 MMOL/L (ref 22–29)
COLOR UR: ABNORMAL
CREAT SERPL-MCNC: 1.11 MG/DL (ref 0.76–1.27)
D-LACTATE SERPL-SCNC: 1.7 MMOL/L (ref 0.5–2)
DEPRECATED RDW RBC AUTO: 47.9 FL (ref 37–54)
EGFRCR SERPLBLD CKD-EPI 2021: 76.5 ML/MIN/1.73
EOSINOPHIL # BLD AUTO: 0 10*3/MM3 (ref 0–0.4)
EOSINOPHIL NFR BLD AUTO: 0 % (ref 0.3–6.2)
ERYTHROCYTE [DISTWIDTH] IN BLOOD BY AUTOMATED COUNT: 13.7 % (ref 12.3–15.4)
GLOBULIN UR ELPH-MCNC: 3.7 GM/DL
GLUCOSE SERPL-MCNC: 116 MG/DL (ref 65–99)
GLUCOSE UR STRIP-MCNC: NEGATIVE MG/DL
HCT VFR BLD AUTO: 40.2 % (ref 37.5–51)
HGB BLD-MCNC: 13 G/DL (ref 13–17.7)
HGB UR QL STRIP.AUTO: ABNORMAL
HOLD SPECIMEN: NORMAL
HOLD SPECIMEN: NORMAL
HYALINE CASTS UR QL AUTO: ABNORMAL /LPF
IMM GRANULOCYTES # BLD AUTO: 0.07 10*3/MM3 (ref 0–0.05)
IMM GRANULOCYTES NFR BLD AUTO: 0.4 % (ref 0–0.5)
KETONES UR QL STRIP: ABNORMAL
LEUKOCYTE ESTERASE UR QL STRIP.AUTO: ABNORMAL
LIPASE SERPL-CCNC: 22 U/L (ref 13–60)
LYMPHOCYTES # BLD AUTO: 1.26 10*3/MM3 (ref 0.7–3.1)
LYMPHOCYTES NFR BLD AUTO: 7.7 % (ref 19.6–45.3)
MCH RBC QN AUTO: 30.7 PG (ref 26.6–33)
MCHC RBC AUTO-ENTMCNC: 32.3 G/DL (ref 31.5–35.7)
MCV RBC AUTO: 95 FL (ref 79–97)
MONOCYTES # BLD AUTO: 1.17 10*3/MM3 (ref 0.1–0.9)
MONOCYTES NFR BLD AUTO: 7.2 % (ref 5–12)
N GONORRHOEA RRNA SPEC QL NAA+PROBE: NOT DETECTED
NEUTROPHILS NFR BLD AUTO: 13.76 10*3/MM3 (ref 1.7–7)
NEUTROPHILS NFR BLD AUTO: 84.5 % (ref 42.7–76)
NITRITE UR QL STRIP: NEGATIVE
NRBC BLD AUTO-RTO: 0 /100 WBC (ref 0–0.2)
PH UR STRIP.AUTO: 7.5 [PH] (ref 5–8)
PLATELET # BLD AUTO: 237 10*3/MM3 (ref 140–450)
PMV BLD AUTO: 9.1 FL (ref 6–12)
POTASSIUM SERPL-SCNC: 3.8 MMOL/L (ref 3.5–5.2)
PROT SERPL-MCNC: 8.3 G/DL (ref 6–8.5)
PROT UR QL STRIP: ABNORMAL
RBC # BLD AUTO: 4.23 10*6/MM3 (ref 4.14–5.8)
RBC # UR STRIP: ABNORMAL /HPF
REF LAB TEST METHOD: ABNORMAL
SODIUM SERPL-SCNC: 134 MMOL/L (ref 136–145)
SP GR UR STRIP: 1.02 (ref 1–1.03)
SQUAMOUS #/AREA URNS HPF: ABNORMAL /HPF
UROBILINOGEN UR QL STRIP: ABNORMAL
WBC # UR STRIP: ABNORMAL /HPF
WBC NRBC COR # BLD AUTO: 16.29 10*3/MM3 (ref 3.4–10.8)
WHOLE BLOOD HOLD COAG: NORMAL
WHOLE BLOOD HOLD SPECIMEN: NORMAL

## 2025-08-27 PROCEDURE — 87591 N.GONORRHOEAE DNA AMP PROB: CPT | Performed by: EMERGENCY MEDICINE

## 2025-08-27 PROCEDURE — 25810000003 SODIUM CHLORIDE 0.9 % SOLUTION: Performed by: EMERGENCY MEDICINE

## 2025-08-27 PROCEDURE — 25010000002 HYDROMORPHONE PER 4 MG: Performed by: EMERGENCY MEDICINE

## 2025-08-27 PROCEDURE — 25010000002 MORPHINE PER 10 MG: Performed by: EMERGENCY MEDICINE

## 2025-08-27 PROCEDURE — 83605 ASSAY OF LACTIC ACID: CPT

## 2025-08-27 PROCEDURE — 85025 COMPLETE CBC W/AUTO DIFF WBC: CPT

## 2025-08-27 PROCEDURE — 76870 US EXAM SCROTUM: CPT

## 2025-08-27 PROCEDURE — 87186 SC STD MICRODIL/AGAR DIL: CPT | Performed by: STUDENT IN AN ORGANIZED HEALTH CARE EDUCATION/TRAINING PROGRAM

## 2025-08-27 PROCEDURE — 25010000002 ONDANSETRON PER 1 MG: Performed by: EMERGENCY MEDICINE

## 2025-08-27 PROCEDURE — 87086 URINE CULTURE/COLONY COUNT: CPT | Performed by: STUDENT IN AN ORGANIZED HEALTH CARE EDUCATION/TRAINING PROGRAM

## 2025-08-27 PROCEDURE — 87077 CULTURE AEROBIC IDENTIFY: CPT | Performed by: STUDENT IN AN ORGANIZED HEALTH CARE EDUCATION/TRAINING PROGRAM

## 2025-08-27 PROCEDURE — 87491 CHLMYD TRACH DNA AMP PROBE: CPT | Performed by: EMERGENCY MEDICINE

## 2025-08-27 PROCEDURE — 87040 BLOOD CULTURE FOR BACTERIA: CPT | Performed by: EMERGENCY MEDICINE

## 2025-08-27 PROCEDURE — 93976 VASCULAR STUDY: CPT

## 2025-08-27 PROCEDURE — 25810000003 LACTATED RINGERS SOLUTION: Performed by: STUDENT IN AN ORGANIZED HEALTH CARE EDUCATION/TRAINING PROGRAM

## 2025-08-27 PROCEDURE — 86361 T CELL ABSOLUTE COUNT: CPT | Performed by: STUDENT IN AN ORGANIZED HEALTH CARE EDUCATION/TRAINING PROGRAM

## 2025-08-27 PROCEDURE — 25010000002 LEVOFLOXACIN PER 250 MG: Performed by: EMERGENCY MEDICINE

## 2025-08-27 PROCEDURE — 83690 ASSAY OF LIPASE: CPT

## 2025-08-27 PROCEDURE — 80053 COMPREHEN METABOLIC PANEL: CPT

## 2025-08-27 PROCEDURE — 81001 URINALYSIS AUTO W/SCOPE: CPT | Performed by: EMERGENCY MEDICINE

## 2025-08-27 PROCEDURE — 86592 SYPHILIS TEST NON-TREP QUAL: CPT | Performed by: STUDENT IN AN ORGANIZED HEALTH CARE EDUCATION/TRAINING PROGRAM

## 2025-08-27 RX ORDER — AMLODIPINE BESYLATE 5 MG/1
10 TABLET ORAL
Status: DISCONTINUED | OUTPATIENT
Start: 2025-08-27 | End: 2025-08-28 | Stop reason: HOSPADM

## 2025-08-27 RX ORDER — ONDANSETRON 2 MG/ML
4 INJECTION INTRAMUSCULAR; INTRAVENOUS EVERY 6 HOURS PRN
Status: DISCONTINUED | OUTPATIENT
Start: 2025-08-27 | End: 2025-08-28 | Stop reason: HOSPADM

## 2025-08-27 RX ORDER — SILDENAFIL 100 MG/1
100 TABLET, FILM COATED ORAL DAILY PRN
COMMUNITY

## 2025-08-27 RX ORDER — LEVOFLOXACIN 5 MG/ML
750 INJECTION, SOLUTION INTRAVENOUS ONCE
Status: COMPLETED | OUTPATIENT
Start: 2025-08-27 | End: 2025-08-27

## 2025-08-27 RX ORDER — BISACODYL 10 MG
10 SUPPOSITORY, RECTAL RECTAL DAILY PRN
Status: DISCONTINUED | OUTPATIENT
Start: 2025-08-27 | End: 2025-08-28 | Stop reason: HOSPADM

## 2025-08-27 RX ORDER — ACETAMINOPHEN 160 MG/5ML
650 SOLUTION ORAL EVERY 4 HOURS PRN
Status: DISCONTINUED | OUTPATIENT
Start: 2025-08-27 | End: 2025-08-28 | Stop reason: HOSPADM

## 2025-08-27 RX ORDER — ROSUVASTATIN CALCIUM 20 MG/1
20 TABLET, COATED ORAL DAILY
COMMUNITY

## 2025-08-27 RX ORDER — AMOXICILLIN 250 MG
2 CAPSULE ORAL 2 TIMES DAILY PRN
Status: DISCONTINUED | OUTPATIENT
Start: 2025-08-27 | End: 2025-08-28 | Stop reason: HOSPADM

## 2025-08-27 RX ORDER — HYDROMORPHONE HYDROCHLORIDE 1 MG/ML
0.5 INJECTION, SOLUTION INTRAMUSCULAR; INTRAVENOUS; SUBCUTANEOUS ONCE
Refills: 0 | Status: COMPLETED | OUTPATIENT
Start: 2025-08-27 | End: 2025-08-27

## 2025-08-27 RX ORDER — ONDANSETRON 2 MG/ML
4 INJECTION INTRAMUSCULAR; INTRAVENOUS ONCE
Status: COMPLETED | OUTPATIENT
Start: 2025-08-27 | End: 2025-08-27

## 2025-08-27 RX ORDER — ACETAMINOPHEN 650 MG/1
650 SUPPOSITORY RECTAL EVERY 4 HOURS PRN
Status: DISCONTINUED | OUTPATIENT
Start: 2025-08-27 | End: 2025-08-28 | Stop reason: HOSPADM

## 2025-08-27 RX ORDER — DIPHENHYDRAMINE HYDROCHLORIDE 50 MG/ML
25 INJECTION, SOLUTION INTRAMUSCULAR; INTRAVENOUS ONCE
Status: DISCONTINUED | OUTPATIENT
Start: 2025-08-28 | End: 2025-08-28 | Stop reason: HOSPADM

## 2025-08-27 RX ORDER — ASPIRIN 81 MG/1
81 TABLET ORAL DAILY
Status: DISCONTINUED | OUTPATIENT
Start: 2025-08-27 | End: 2025-08-28 | Stop reason: HOSPADM

## 2025-08-27 RX ORDER — NITROFURANTOIN MACROCRYSTALS 50 MG/1
50 CAPSULE ORAL NIGHTLY
COMMUNITY
End: 2025-08-28 | Stop reason: HOSPADM

## 2025-08-27 RX ORDER — VALSARTAN 80 MG/1
320 TABLET ORAL
Status: DISCONTINUED | OUTPATIENT
Start: 2025-08-27 | End: 2025-08-28 | Stop reason: HOSPADM

## 2025-08-27 RX ORDER — ROSUVASTATIN CALCIUM 10 MG/1
20 TABLET, COATED ORAL DAILY
Status: DISCONTINUED | OUTPATIENT
Start: 2025-08-27 | End: 2025-08-28 | Stop reason: HOSPADM

## 2025-08-27 RX ORDER — ONDANSETRON 4 MG/1
4 TABLET, ORALLY DISINTEGRATING ORAL EVERY 6 HOURS PRN
Status: DISCONTINUED | OUTPATIENT
Start: 2025-08-27 | End: 2025-08-28 | Stop reason: HOSPADM

## 2025-08-27 RX ORDER — GABAPENTIN 300 MG/1
300 CAPSULE ORAL 3 TIMES DAILY PRN
Status: DISCONTINUED | OUTPATIENT
Start: 2025-08-27 | End: 2025-08-28 | Stop reason: HOSPADM

## 2025-08-27 RX ORDER — ACETAMINOPHEN 500 MG
1000 TABLET ORAL EVERY 8 HOURS PRN
Status: DISCONTINUED | OUTPATIENT
Start: 2025-08-27 | End: 2025-08-28 | Stop reason: HOSPADM

## 2025-08-27 RX ORDER — MORPHINE SULFATE 2 MG/ML
2 INJECTION, SOLUTION INTRAMUSCULAR; INTRAVENOUS ONCE
Status: DISCONTINUED | OUTPATIENT
Start: 2025-08-27 | End: 2025-08-28 | Stop reason: HOSPADM

## 2025-08-27 RX ORDER — BICTEGRAVIR SODIUM, EMTRICITABINE, AND TENOFOVIR ALAFENAMIDE FUMARATE 50; 200; 25 MG/1; MG/1; MG/1
1 TABLET ORAL DAILY
COMMUNITY

## 2025-08-27 RX ORDER — ACETAMINOPHEN 500 MG
1000 TABLET ORAL ONCE
Status: COMPLETED | OUTPATIENT
Start: 2025-08-27 | End: 2025-08-27

## 2025-08-27 RX ORDER — POLYETHYLENE GLYCOL 3350 17 G/17G
17 POWDER, FOR SOLUTION ORAL DAILY PRN
Status: DISCONTINUED | OUTPATIENT
Start: 2025-08-27 | End: 2025-08-28 | Stop reason: HOSPADM

## 2025-08-27 RX ORDER — OXYCODONE HYDROCHLORIDE 5 MG/1
5 TABLET ORAL EVERY 4 HOURS PRN
Status: DISCONTINUED | OUTPATIENT
Start: 2025-08-27 | End: 2025-08-28 | Stop reason: HOSPADM

## 2025-08-27 RX ORDER — GABAPENTIN 300 MG/1
300 CAPSULE ORAL 3 TIMES DAILY PRN
COMMUNITY

## 2025-08-27 RX ORDER — BISACODYL 5 MG/1
5 TABLET, DELAYED RELEASE ORAL DAILY PRN
Status: DISCONTINUED | OUTPATIENT
Start: 2025-08-27 | End: 2025-08-28 | Stop reason: HOSPADM

## 2025-08-27 RX ORDER — LEVOFLOXACIN 5 MG/ML
750 INJECTION, SOLUTION INTRAVENOUS EVERY 24 HOURS
Status: DISCONTINUED | OUTPATIENT
Start: 2025-08-28 | End: 2025-08-28

## 2025-08-27 RX ORDER — ACETAMINOPHEN 650 MG/1
650 SUPPOSITORY RECTAL EVERY 4 HOURS PRN
Status: DISCONTINUED | OUTPATIENT
Start: 2025-08-27 | End: 2025-08-27

## 2025-08-27 RX ORDER — ACETAMINOPHEN 325 MG/1
650 TABLET ORAL EVERY 4 HOURS PRN
Status: DISCONTINUED | OUTPATIENT
Start: 2025-08-27 | End: 2025-08-27

## 2025-08-27 RX ORDER — CETIRIZINE HYDROCHLORIDE 10 MG/1
10 TABLET ORAL DAILY
Status: DISCONTINUED | OUTPATIENT
Start: 2025-08-27 | End: 2025-08-28 | Stop reason: HOSPADM

## 2025-08-27 RX ORDER — ACETAMINOPHEN 160 MG/5ML
650 SOLUTION ORAL EVERY 4 HOURS PRN
Status: DISCONTINUED | OUTPATIENT
Start: 2025-08-27 | End: 2025-08-27

## 2025-08-27 RX ADMIN — ROSUVASTATIN CALCIUM 20 MG: 10 TABLET, FILM COATED ORAL at 10:02

## 2025-08-27 RX ADMIN — ACETAMINOPHEN 1000 MG: 500 TABLET, FILM COATED ORAL at 05:18

## 2025-08-27 RX ADMIN — LEVOFLOXACIN 750 MG: 750 INJECTION, SOLUTION INTRAVENOUS at 04:22

## 2025-08-27 RX ADMIN — SODIUM CHLORIDE, POTASSIUM CHLORIDE, SODIUM LACTATE AND CALCIUM CHLORIDE 1000 ML: 600; 310; 30; 20 INJECTION, SOLUTION INTRAVENOUS at 08:31

## 2025-08-27 RX ADMIN — ONDANSETRON 4 MG: 2 INJECTION, SOLUTION INTRAMUSCULAR; INTRAVENOUS at 04:21

## 2025-08-27 RX ADMIN — CETIRIZINE HYDROCHLORIDE 10 MG: 10 TABLET, FILM COATED ORAL at 10:02

## 2025-08-27 RX ADMIN — BICTEGRAVIR SODIUM, EMTRICITABINE, AND TENOFOVIR ALAFENAMIDE FUMARATE 1 TABLET: 50; 200; 25 TABLET ORAL at 10:00

## 2025-08-27 RX ADMIN — OXYCODONE 5 MG: 5 TABLET ORAL at 11:46

## 2025-08-27 RX ADMIN — SODIUM CHLORIDE 1000 ML: 9 INJECTION, SOLUTION INTRAVENOUS at 04:21

## 2025-08-27 RX ADMIN — HYDROMORPHONE HYDROCHLORIDE 0.5 MG: 1 INJECTION, SOLUTION INTRAMUSCULAR; INTRAVENOUS; SUBCUTANEOUS at 05:17

## 2025-08-27 RX ADMIN — ASPIRIN 81 MG: 81 TABLET, COATED ORAL at 10:02

## 2025-08-28 VITALS
RESPIRATION RATE: 15 BRPM | DIASTOLIC BLOOD PRESSURE: 85 MMHG | WEIGHT: 166.45 LBS | TEMPERATURE: 98.2 F | BODY MASS INDEX: 23.83 KG/M2 | SYSTOLIC BLOOD PRESSURE: 129 MMHG | OXYGEN SATURATION: 100 % | HEIGHT: 70 IN | HEART RATE: 63 BPM

## 2025-08-28 LAB
ANION GAP SERPL CALCULATED.3IONS-SCNC: 10.2 MMOL/L (ref 5–15)
BASOPHILS # BLD AUTO: 0 X10E3/UL (ref 0–0.2)
BASOPHILS # BLD AUTO: 0.03 10*3/MM3 (ref 0–0.2)
BASOPHILS NFR BLD AUTO: 0 %
BASOPHILS NFR BLD AUTO: 0.2 % (ref 0–1.5)
BUN SERPL-MCNC: 11.2 MG/DL (ref 6–20)
BUN/CREAT SERPL: 11.9 (ref 7–25)
CALCIUM SPEC-SCNC: 8.7 MG/DL (ref 8.6–10.5)
CD3+CD4+ CELLS # BLD: 335 /UL (ref 359–1519)
CD3+CD4+ CELLS NFR BLD: 18.6 % (ref 30.8–58.5)
CHLORIDE SERPL-SCNC: 100 MMOL/L (ref 98–107)
CO2 SERPL-SCNC: 23.8 MMOL/L (ref 22–29)
CREAT SERPL-MCNC: 0.94 MG/DL (ref 0.76–1.27)
DEPRECATED RDW RBC AUTO: 48.5 FL (ref 37–54)
EGFRCR SERPLBLD CKD-EPI 2021: 93.4 ML/MIN/1.73
EOSINOPHIL # BLD AUTO: 0 X10E3/UL (ref 0–0.4)
EOSINOPHIL # BLD AUTO: 0.18 10*3/MM3 (ref 0–0.4)
EOSINOPHIL NFR BLD AUTO: 0 %
EOSINOPHIL NFR BLD AUTO: 1.4 % (ref 0.3–6.2)
ERYTHROCYTE [DISTWIDTH] IN BLOOD BY AUTOMATED COUNT: 13.1 % (ref 11.6–15.4)
ERYTHROCYTE [DISTWIDTH] IN BLOOD BY AUTOMATED COUNT: 13.6 % (ref 12.3–15.4)
GLUCOSE SERPL-MCNC: 109 MG/DL (ref 65–99)
HCT VFR BLD AUTO: 34.8 % (ref 37.5–51)
HCT VFR BLD AUTO: 36.8 % (ref 37.5–51)
HGB BLD-MCNC: 11.2 G/DL (ref 13–17.7)
HGB BLD-MCNC: 12.1 G/DL (ref 13–17.7)
IMM GRANULOCYTES # BLD AUTO: 0 X10E3/UL (ref 0–0.1)
IMM GRANULOCYTES # BLD AUTO: 0.03 10*3/MM3 (ref 0–0.05)
IMM GRANULOCYTES NFR BLD AUTO: 0 %
IMM GRANULOCYTES NFR BLD AUTO: 0.2 % (ref 0–0.5)
LYMPHOCYTES # BLD AUTO: 1.8 X10E3/UL (ref 0.7–3.1)
LYMPHOCYTES # BLD AUTO: 1.89 10*3/MM3 (ref 0.7–3.1)
LYMPHOCYTES NFR BLD AUTO: 11 %
LYMPHOCYTES NFR BLD AUTO: 14.8 % (ref 19.6–45.3)
MCH RBC QN AUTO: 30.8 PG (ref 26.6–33)
MCH RBC QN AUTO: 31.7 PG (ref 26.6–33)
MCHC RBC AUTO-ENTMCNC: 32.2 G/DL (ref 31.5–35.7)
MCHC RBC AUTO-ENTMCNC: 32.9 G/DL (ref 31.5–35.7)
MCV RBC AUTO: 95.6 FL (ref 79–97)
MCV RBC AUTO: 96 FL (ref 79–97)
MONOCYTES # BLD AUTO: 0.6 X10E3/UL (ref 0.1–0.9)
MONOCYTES # BLD AUTO: 0.79 10*3/MM3 (ref 0.1–0.9)
MONOCYTES NFR BLD AUTO: 4 %
MONOCYTES NFR BLD AUTO: 6.2 % (ref 5–12)
NEUTROPHILS # BLD AUTO: 13.6 X10E3/UL (ref 1.4–7)
NEUTROPHILS NFR BLD AUTO: 77.2 % (ref 42.7–76)
NEUTROPHILS NFR BLD AUTO: 85 %
NEUTROPHILS NFR BLD AUTO: 9.89 10*3/MM3 (ref 1.7–7)
NRBC BLD AUTO-RTO: 0 /100 WBC (ref 0–0.2)
PLATELET # BLD AUTO: 212 10*3/MM3 (ref 140–450)
PLATELET # BLD AUTO: 238 X10E3/UL (ref 150–450)
PMV BLD AUTO: 9.9 FL (ref 6–12)
POTASSIUM SERPL-SCNC: 3.6 MMOL/L (ref 3.5–5.2)
RBC # BLD AUTO: 3.64 10*6/MM3 (ref 4.14–5.8)
RBC # BLD AUTO: 3.82 X10E6/UL (ref 4.14–5.8)
RPR SER QL: NORMAL
SODIUM SERPL-SCNC: 134 MMOL/L (ref 136–145)
WBC # BLD AUTO: 16.1 X10E3/UL (ref 3.4–10.8)
WBC NRBC COR # BLD AUTO: 12.81 10*3/MM3 (ref 3.4–10.8)

## 2025-08-28 PROCEDURE — 25010000002 LEVOFLOXACIN PER 250 MG: Performed by: STUDENT IN AN ORGANIZED HEALTH CARE EDUCATION/TRAINING PROGRAM

## 2025-08-28 PROCEDURE — 93005 ELECTROCARDIOGRAM TRACING: CPT | Performed by: STUDENT IN AN ORGANIZED HEALTH CARE EDUCATION/TRAINING PROGRAM

## 2025-08-28 RX ORDER — OXYCODONE HYDROCHLORIDE 5 MG/1
5 TABLET ORAL EVERY 8 HOURS PRN
Qty: 10 TABLET | Refills: 0 | Status: SHIPPED | OUTPATIENT
Start: 2025-08-28 | End: 2025-09-01

## 2025-08-28 RX ORDER — LEVOFLOXACIN 750 MG/1
750 TABLET, FILM COATED ORAL
Qty: 12 TABLET | Refills: 0 | Status: SHIPPED | OUTPATIENT
Start: 2025-08-29 | End: 2025-09-10

## 2025-08-28 RX ORDER — LEVOFLOXACIN 750 MG/1
750 TABLET, FILM COATED ORAL
Status: DISCONTINUED | OUTPATIENT
Start: 2025-08-29 | End: 2025-08-28 | Stop reason: HOSPADM

## 2025-08-28 RX ADMIN — BICTEGRAVIR SODIUM, EMTRICITABINE, AND TENOFOVIR ALAFENAMIDE FUMARATE 1 TABLET: 50; 200; 25 TABLET ORAL at 08:23

## 2025-08-28 RX ADMIN — ASPIRIN 81 MG: 81 TABLET, COATED ORAL at 08:23

## 2025-08-28 RX ADMIN — OXYCODONE 5 MG: 5 TABLET ORAL at 08:32

## 2025-08-28 RX ADMIN — ACETAMINOPHEN 1000 MG: 500 TABLET, FILM COATED ORAL at 00:02

## 2025-08-28 RX ADMIN — CETIRIZINE HYDROCHLORIDE 10 MG: 10 TABLET, FILM COATED ORAL at 08:23

## 2025-08-28 RX ADMIN — LEVOFLOXACIN 750 MG: 750 INJECTION, SOLUTION INTRAVENOUS at 03:54

## 2025-08-28 RX ADMIN — ROSUVASTATIN CALCIUM 20 MG: 10 TABLET, FILM COATED ORAL at 08:23

## 2025-08-30 LAB
QT INTERVAL: 429 MS
QTC INTERVAL: 456 MS

## 2025-08-31 LAB — BACTERIA SPEC AEROBE CULT: ABNORMAL

## (undated) DEVICE — PINNACLE INTRODUCER SHEATH: Brand: PINNACLE

## (undated) DEVICE — GUIDE CATHETER: Brand: MACH1™

## (undated) DEVICE — FINECROSS MG CORONARY MICRO-GUIDE CATHETER: Brand: FINECROSS

## (undated) DEVICE — DEV INFL COMPAK W/ACCESSPLUS IN4530

## (undated) DEVICE — CATH MPAC STP 6F: Brand: SUPER TORQUE

## (undated) DEVICE — GW PTFE EMERALD HEPCOAT FC J TIP STD .035 3MM 150CM

## (undated) DEVICE — ST ACC MICROPUNCTURE STFF/CANN PLAT/TP 4F 21G 40CM

## (undated) DEVICE — TBG NAMIC PRESS MONTR A/ F/M 12IN

## (undated) DEVICE — MINI TREK CORONARY DILATATION CATHETER 2.0 MM X 12 MM / RAPID-EXCHANGE: Brand: MINI TREK

## (undated) DEVICE — CONTRST ISOVUE300 61PCT 50ML

## (undated) DEVICE — HI-TORQUE WHISPER MS GUIDE WIRE .014 STRAIGHT TIP 3.0 CM X 190 CM: Brand: HI-TORQUE WHISPER

## (undated) DEVICE — PK TRY HEART CATH 50

## (undated) DEVICE — BOWL PLSTC MD 16OZ BLU STRL

## (undated) DEVICE — NC TREK NEO™ CORONARY DILATATION CATHETER 3.00 MM X 15 MM / RAPID-EXCHANGE: Brand: NC TREK NEO™

## (undated) DEVICE — HI-TORQUE WHISPER MS GUIDE WIRE .014 STRAIGHT TIP 3.0 CM X 300 CM: Brand: HI-TORQUE WHISPER

## (undated) DEVICE — BALN PTCA TAKERU RX 2X12MM

## (undated) DEVICE — ELECTRD DEFIB M/FUNC PROPADZ RADIOL 2PK

## (undated) DEVICE — Device: Brand: PROWATERFLEX